# Patient Record
Sex: FEMALE | Race: WHITE | NOT HISPANIC OR LATINO | Employment: OTHER | URBAN - NONMETROPOLITAN AREA
[De-identification: names, ages, dates, MRNs, and addresses within clinical notes are randomized per-mention and may not be internally consistent; named-entity substitution may affect disease eponyms.]

---

## 2021-02-08 ENCOUNTER — OFFICE VISIT (OUTPATIENT)
Dept: INTERNAL MEDICINE | Facility: CLINIC | Age: 76
End: 2021-02-08

## 2021-02-08 VITALS
DIASTOLIC BLOOD PRESSURE: 102 MMHG | WEIGHT: 144.4 LBS | HEIGHT: 64 IN | HEART RATE: 112 BPM | OXYGEN SATURATION: 97 % | SYSTOLIC BLOOD PRESSURE: 160 MMHG | BODY MASS INDEX: 24.65 KG/M2 | TEMPERATURE: 98.4 F

## 2021-02-08 DIAGNOSIS — Z13.220 LIPID SCREENING: ICD-10-CM

## 2021-02-08 DIAGNOSIS — R01.1 MURMUR: ICD-10-CM

## 2021-02-08 DIAGNOSIS — I10 ESSENTIAL HYPERTENSION: Primary | ICD-10-CM

## 2021-02-08 DIAGNOSIS — R42 DIZZINESS: ICD-10-CM

## 2021-02-08 DIAGNOSIS — H92.01 RIGHT EAR PAIN: ICD-10-CM

## 2021-02-08 PROCEDURE — 99214 OFFICE O/P EST MOD 30 MIN: CPT | Performed by: FAMILY MEDICINE

## 2021-02-08 RX ORDER — LISINOPRIL 20 MG/1
20 TABLET ORAL DAILY
Qty: 90 TABLET | Refills: 1 | Status: SHIPPED | OUTPATIENT
Start: 2021-02-08 | End: 2021-03-08

## 2021-02-08 RX ORDER — MECLIZINE HYDROCHLORIDE 25 MG/1
25 TABLET ORAL 3 TIMES DAILY PRN
Qty: 90 TABLET | Refills: 0 | Status: SHIPPED | OUTPATIENT
Start: 2021-02-08

## 2021-02-08 NOTE — ASSESSMENT & PLAN NOTE
Patient has never been diagnosed with a murmur.  As this is a new finding, will obtain echocardiogram for further evaluation.

## 2021-02-08 NOTE — ASSESSMENT & PLAN NOTE
Likely secondary to recent ear infection and residual fluid on the ear.  May take meclizine prn for dizziness.

## 2021-02-08 NOTE — PROGRESS NOTES
Nellie Martinez is a 75 y.o. female.    Chief Complaint   Patient presents with   • Earache   • Hypertension       HPI   Patient presents today to \[facility]\"" care.  She was seen at [facility] for an ear infection and was treated with amoxicillin and ear drops.  She is still having trouble hearing out of right ear.  She still has pain in the ear.  Denies any discharge from the ear.  She reports nasal discharge in the morning, but no other sinus symptoms.      Patient has hypertension that was diagnosed in the urgent care. .  They are taking lisinopril (Prinivil).  They have been compliant with medications.  The patient denies any side effects to the medication.  Blood pressure is not controlled in the office today.  Blood pressure has been running 140/70's at home.  They are following a low salt diet.  They are active.    The following portions of the patient's history were reviewed and updated as appropriate: allergies, current medications, past family history, past medical history, past social history, past surgical history and problem list.     Past Medical History:   Diagnosis Date   • Hypertension        Past Surgical History:   Procedure Laterality Date   • APPENDECTOMY     • HERNIA REPAIR         Family History   Problem Relation Age of Onset   • Stroke Mother    • Hypertension Mother    • Heart attack Father    • Stomach cancer Paternal Grandmother        Social History     Socioeconomic History   • Marital status:      Spouse name: Not on file   • Number of children: Not on file   • Years of education: Not on file   • Highest education level: Not on file   Tobacco Use   • Smoking status: Former Smoker   • Smokeless tobacco: Never Used   • Tobacco comment: quit 5 years ago   Substance and Sexual Activity   • Alcohol use: No     Frequency: Never   • Drug use: No   • Sexual activity: Yes     Partners: Male       No Known Allergies      Current Outpatient Medications:   •  ascorbic acid (VITAMIN C) 500 MG  tablet, Take 500 mg by mouth Daily., Disp: , Rfl:   •  Calcium 500-100 MG-UNIT chewable tablet, Chew 1 tablet Every 4 (Four) Hours As Needed., Disp: , Rfl:   •  lisinopril (PRINIVIL,ZESTRIL) 20 MG tablet, Take 1 tablet by mouth Daily for 14 days., Disp: 90 tablet, Rfl: 1  •  multivitamin with minerals (CENTRUM SILVER 50+WOMEN PO), Take 1 tablet by mouth Daily., Disp: , Rfl:   •  Potassium 75 MG tablet, Take  by mouth., Disp: , Rfl:   •  vitamin D3 125 MCG (5000 UT) capsule capsule, Take 5,000 Units by mouth Daily., Disp: , Rfl:   •  vitamin E 400 UNIT capsule, Take 400 Units by mouth Daily., Disp: , Rfl:   •  meclizine (ANTIVERT) 25 MG tablet, Take 1 tablet by mouth 3 (Three) Times a Day As Needed for Dizziness., Disp: 90 tablet, Rfl: 0    ROS    Review of Systems   Constitutional: Negative for chills, fatigue and fever.   HENT: Positive for ear pain and hearing loss. Negative for congestion, postnasal drip and sore throat.    Eyes: Negative for blurred vision and visual disturbance.   Respiratory: Negative for cough and shortness of breath.    Cardiovascular: Negative for chest pain and leg swelling.   Gastrointestinal: Negative for abdominal pain, constipation, diarrhea, nausea and vomiting.   Endocrine: Negative for cold intolerance and heat intolerance.   Genitourinary: Negative for dysuria and frequency.   Musculoskeletal: Negative for arthralgias and back pain.   Skin: Negative for color change and rash.   Allergic/Immunologic: Negative for environmental allergies.   Neurological: Positive for dizziness. Negative for weakness, numbness and headache.   Hematological: Bruises/bleeds easily.   Psychiatric/Behavioral: Negative for depressed mood. The patient is not nervous/anxious.        Vitals:    02/08/21 1516   BP: (!) 160/102   BP Location: Left arm   Patient Position: Sitting   Cuff Size: Adult   Pulse: 112   Temp: 98.4 °F (36.9 °C)   TempSrc: Temporal   SpO2: 97%   Weight: 65.5 kg (144 lb 6.4 oz)   Height:  "162.6 cm (64\")     Body mass index is 24.79 kg/m².    Physical Exam     Physical Exam  Constitutional:       General: She is not in acute distress.     Appearance: She is well-developed.   HENT:      Head: Normocephalic and atraumatic.      Right Ear: External ear normal. Tenderness present. A middle ear effusion (trace) is present.      Left Ear: Tympanic membrane and external ear normal.   Eyes:      Conjunctiva/sclera: Conjunctivae normal.      Pupils: Pupils are equal, round, and reactive to light.   Neck:      Musculoskeletal: Normal range of motion and neck supple.   Cardiovascular:      Rate and Rhythm: Normal rate and regular rhythm.      Heart sounds: Murmur present.   Pulmonary:      Effort: Pulmonary effort is normal. No respiratory distress.      Breath sounds: Normal breath sounds. No wheezing.   Abdominal:      General: Bowel sounds are normal. There is no distension.      Palpations: Abdomen is soft.      Tenderness: There is no abdominal tenderness.   Musculoskeletal:      Right lower leg: No edema.      Left lower leg: No edema.   Lymphadenopathy:      Cervical: No cervical adenopathy.   Skin:     General: Skin is warm and dry.   Neurological:      Mental Status: She is alert and oriented to person, place, and time.      Cranial Nerves: No cranial nerve deficit.      Deep Tendon Reflexes: Reflexes normal.   Psychiatric:         Mood and Affect: Mood normal.         Behavior: Behavior normal.         Assessment/Plan    Problems Addressed this Visit        Cardiac and Vasculature    Essential hypertension - Primary     Uncontrolled.  Will increase lisinopril to 20mg daily.           Relevant Medications    lisinopril (PRINIVIL,ZESTRIL) 20 MG tablet    Other Relevant Orders    CBC & Differential    Comprehensive Metabolic Panel    TSH    Murmur     Patient has never been diagnosed with a murmur.  As this is a new finding, will obtain echocardiogram for further evaluation.          Relevant Orders    " Adult Transthoracic Echo Complete W/ Cont if Necessary Per Protocol       ENT    Right ear pain     Likely residual pain from recent ear infection.  She does still have trace middle ear effusion, which may be contributing to dizziness.  Encouraged to take allegra OTC for effusion.             Symptoms and Signs    Dizziness     Likely secondary to recent ear infection and residual fluid on the ear.  May take meclizine prn for dizziness.            Other Visit Diagnoses     Lipid screening        Relevant Orders    Lipid Panel       New Medications Ordered This Visit   Medications   • lisinopril (PRINIVIL,ZESTRIL) 20 MG tablet     Sig: Take 1 tablet by mouth Daily for 14 days.     Dispense:  90 tablet     Refill:  1   • meclizine (ANTIVERT) 25 MG tablet     Sig: Take 1 tablet by mouth 3 (Three) Times a Day As Needed for Dizziness.     Dispense:  90 tablet     Refill:  0       No orders of the defined types were placed in this encounter.      Return in about 1 month (around 3/8/2021) for HTN.      Faviola Hanson,

## 2021-02-08 NOTE — ASSESSMENT & PLAN NOTE
Likely residual pain from recent ear infection.  She does still have trace middle ear effusion, which may be contributing to dizziness.  Encouraged to take allegra OTC for effusion.

## 2021-02-09 LAB
ALBUMIN SERPL-MCNC: 4.5 G/DL (ref 3.5–5.2)
ALBUMIN/GLOB SERPL: 1.6 G/DL
ALP SERPL-CCNC: 81 U/L (ref 39–117)
ALT SERPL-CCNC: 18 U/L (ref 1–33)
AST SERPL-CCNC: 25 U/L (ref 1–32)
BASOPHILS # BLD AUTO: 0.06 10*3/MM3 (ref 0–0.2)
BASOPHILS NFR BLD AUTO: 0.8 % (ref 0–1.5)
BILIRUB SERPL-MCNC: 0.2 MG/DL (ref 0–1.2)
BUN SERPL-MCNC: 11 MG/DL (ref 8–23)
BUN/CREAT SERPL: 16.4 (ref 7–25)
CALCIUM SERPL-MCNC: 9.8 MG/DL (ref 8.6–10.5)
CHLORIDE SERPL-SCNC: 95 MMOL/L (ref 98–107)
CHOLEST SERPL-MCNC: 211 MG/DL (ref 0–200)
CO2 SERPL-SCNC: 27.1 MMOL/L (ref 22–29)
CREAT SERPL-MCNC: 0.67 MG/DL (ref 0.57–1)
EOSINOPHIL # BLD AUTO: 0.06 10*3/MM3 (ref 0–0.4)
EOSINOPHIL NFR BLD AUTO: 0.8 % (ref 0.3–6.2)
ERYTHROCYTE [DISTWIDTH] IN BLOOD BY AUTOMATED COUNT: 12.7 % (ref 12.3–15.4)
GLOBULIN SER CALC-MCNC: 2.8 GM/DL
GLUCOSE SERPL-MCNC: 111 MG/DL (ref 65–99)
HCT VFR BLD AUTO: 42.5 % (ref 34–46.6)
HDLC SERPL-MCNC: 69 MG/DL (ref 40–60)
HGB BLD-MCNC: 14.1 G/DL (ref 12–15.9)
IMM GRANULOCYTES # BLD AUTO: 0.03 10*3/MM3 (ref 0–0.05)
IMM GRANULOCYTES NFR BLD AUTO: 0.4 % (ref 0–0.5)
LDLC SERPL CALC-MCNC: 128 MG/DL (ref 0–100)
LYMPHOCYTES # BLD AUTO: 1.78 10*3/MM3 (ref 0.7–3.1)
LYMPHOCYTES NFR BLD AUTO: 23 % (ref 19.6–45.3)
MCH RBC QN AUTO: 29.9 PG (ref 26.6–33)
MCHC RBC AUTO-ENTMCNC: 33.2 G/DL (ref 31.5–35.7)
MCV RBC AUTO: 90.2 FL (ref 79–97)
MONOCYTES # BLD AUTO: 0.42 10*3/MM3 (ref 0.1–0.9)
MONOCYTES NFR BLD AUTO: 5.4 % (ref 5–12)
NEUTROPHILS # BLD AUTO: 5.39 10*3/MM3 (ref 1.7–7)
NEUTROPHILS NFR BLD AUTO: 69.6 % (ref 42.7–76)
NRBC BLD AUTO-RTO: 0 /100 WBC (ref 0–0.2)
PLATELET # BLD AUTO: 309 10*3/MM3 (ref 140–450)
POTASSIUM SERPL-SCNC: 5.1 MMOL/L (ref 3.5–5.2)
PROT SERPL-MCNC: 7.3 G/DL (ref 6–8.5)
RBC # BLD AUTO: 4.71 10*6/MM3 (ref 3.77–5.28)
SODIUM SERPL-SCNC: 133 MMOL/L (ref 136–145)
TRIGL SERPL-MCNC: 78 MG/DL (ref 0–150)
TSH SERPL DL<=0.005 MIU/L-ACNC: 1.26 UIU/ML (ref 0.27–4.2)
VLDLC SERPL CALC-MCNC: 14 MG/DL (ref 5–40)
WBC # BLD AUTO: 7.74 10*3/MM3 (ref 3.4–10.8)

## 2021-03-03 LAB
BH CV ECHO MEAS - % IVS THICK: 22.2 %
BH CV ECHO MEAS - % LVPW THICK: 23.1 %
BH CV ECHO MEAS - AO MAX PG (FULL): 1.1 MMHG
BH CV ECHO MEAS - AO MAX PG: 8 MMHG
BH CV ECHO MEAS - AO MEAN PG (FULL): 1 MMHG
BH CV ECHO MEAS - AO MEAN PG: 4 MMHG
BH CV ECHO MEAS - AO ROOT AREA (BSA CORRECTED): 1.2
BH CV ECHO MEAS - AO ROOT AREA: 3.5 CM^2
BH CV ECHO MEAS - AO ROOT DIAM: 2.1 CM
BH CV ECHO MEAS - AO V2 MAX: 140 CM/SEC
BH CV ECHO MEAS - AO V2 MEAN: 94.7 CM/SEC
BH CV ECHO MEAS - AO V2 VTI: 25.6 CM
BH CV ECHO MEAS - AVA(I,A): 3.3 CM^2
BH CV ECHO MEAS - AVA(I,D): 3.3 CM^2
BH CV ECHO MEAS - AVA(V,A): 3.3 CM^2
BH CV ECHO MEAS - AVA(V,D): 3.3 CM^2
BH CV ECHO MEAS - BSA(HAYCOCK): 1.7 M^2
BH CV ECHO MEAS - BSA: 1.7 M^2
BH CV ECHO MEAS - BZI_BMI: 24.7 KILOGRAMS/M^2
BH CV ECHO MEAS - BZI_METRIC_HEIGHT: 162.6 CM
BH CV ECHO MEAS - BZI_METRIC_WEIGHT: 65.3 KG
BH CV ECHO MEAS - EDV(CUBED): 35.9 ML
BH CV ECHO MEAS - EDV(MOD-SP4): 92 ML
BH CV ECHO MEAS - EDV(TEICH): 44.1 ML
BH CV ECHO MEAS - EF(CUBED): 74.2 %
BH CV ECHO MEAS - EF(MOD-SP4): 72.8 %
BH CV ECHO MEAS - EF(TEICH): 67.4 %
BH CV ECHO MEAS - ESV(CUBED): 9.3 ML
BH CV ECHO MEAS - ESV(MOD-SP4): 25 ML
BH CV ECHO MEAS - ESV(TEICH): 14.4 ML
BH CV ECHO MEAS - FS: 36.4 %
BH CV ECHO MEAS - IVS/LVPW: 0.69
BH CV ECHO MEAS - IVSD: 0.9 CM
BH CV ECHO MEAS - IVSS: 1.1 CM
BH CV ECHO MEAS - LA DIMENSION: 3.6 CM
BH CV ECHO MEAS - LA/AO: 1.7
BH CV ECHO MEAS - LAD MAJOR: 5 CM
BH CV ECHO MEAS - LAT PEAK E' VEL: 14.1 CM/SEC
BH CV ECHO MEAS - LATERAL E/E' RATIO: 4.6
BH CV ECHO MEAS - LV DIASTOLIC VOL/BSA (35-75): 54.1 ML/M^2
BH CV ECHO MEAS - LV IVRT: 0.09 SEC
BH CV ECHO MEAS - LV MASS(C)D: 109.1 GRAMS
BH CV ECHO MEAS - LV MASS(C)DI: 64.1 GRAMS/M^2
BH CV ECHO MEAS - LV MASS(C)S: 84.9 GRAMS
BH CV ECHO MEAS - LV MASS(C)SI: 49.9 GRAMS/M^2
BH CV ECHO MEAS - LV MAX PG: 6.9 MMHG
BH CV ECHO MEAS - LV MEAN PG: 3 MMHG
BH CV ECHO MEAS - LV SYSTOLIC VOL/BSA (12-30): 14.7 ML/M^2
BH CV ECHO MEAS - LV V1 MAX: 131.5 CM/SEC
BH CV ECHO MEAS - LV V1 MEAN: 73.9 CM/SEC
BH CV ECHO MEAS - LV V1 VTI: 24.4 CM
BH CV ECHO MEAS - LVIDD: 3.3 CM
BH CV ECHO MEAS - LVIDS: 2.1 CM
BH CV ECHO MEAS - LVLD AP4: 7.4 CM
BH CV ECHO MEAS - LVLS AP4: 5.4 CM
BH CV ECHO MEAS - LVOT AREA (M): 3.5 CM^2
BH CV ECHO MEAS - LVOT AREA: 3.5 CM^2
BH CV ECHO MEAS - LVOT DIAM: 2.1 CM
BH CV ECHO MEAS - LVPWD: 1 CM
BH CV ECHO MEAS - LVPWS: 1.6 CM
BH CV ECHO MEAS - MED PEAK E' VEL: 8.5 CM/SEC
BH CV ECHO MEAS - MEDIAL E/E' RATIO: 7.6
BH CV ECHO MEAS - MV A MAX VEL: 94.2 CM/SEC
BH CV ECHO MEAS - MV DEC SLOPE: 392.5 CM/SEC^2
BH CV ECHO MEAS - MV DEC TIME: 0.19 SEC
BH CV ECHO MEAS - MV E MAX VEL: 64.6 CM/SEC
BH CV ECHO MEAS - MV E/A: 0.69
BH CV ECHO MEAS - MV MAX PG: 5.1 MMHG
BH CV ECHO MEAS - MV MEAN PG: 2 MMHG
BH CV ECHO MEAS - MV P1/2T MAX VEL: 65.3 CM/SEC
BH CV ECHO MEAS - MV P1/2T: 48.7 MSEC
BH CV ECHO MEAS - MV V2 MAX: 113 CM/SEC
BH CV ECHO MEAS - MV V2 MEAN: 73.9 CM/SEC
BH CV ECHO MEAS - MV V2 VTI: 21.4 CM
BH CV ECHO MEAS - MVA P1/2T LCG: 3.4 CM^2
BH CV ECHO MEAS - MVA(P1/2T): 4.5 CM^2
BH CV ECHO MEAS - MVA(VTI): 3.9 CM^2
BH CV ECHO MEAS - PA MAX PG: 4.7 MMHG
BH CV ECHO MEAS - PA V2 MAX: 108 CM/SEC
BH CV ECHO MEAS - RAP SYSTOLE: 3 MMHG
BH CV ECHO MEAS - RVSP: 36 MMHG
BH CV ECHO MEAS - SI(AO): 52.1 ML/M^2
BH CV ECHO MEAS - SI(CUBED): 15.7 ML/M^2
BH CV ECHO MEAS - SI(LVOT): 49.7 ML/M^2
BH CV ECHO MEAS - SI(MOD-SP4): 39.4 ML/M^2
BH CV ECHO MEAS - SI(TEICH): 17.5 ML/M^2
BH CV ECHO MEAS - SV(AO): 88.7 ML
BH CV ECHO MEAS - SV(CUBED): 26.7 ML
BH CV ECHO MEAS - SV(LVOT): 84.5 ML
BH CV ECHO MEAS - SV(MOD-SP4): 67 ML
BH CV ECHO MEAS - SV(TEICH): 29.7 ML
BH CV ECHO MEAS - TR MAX PG: 33 MMHG
BH CV ECHO MEAS - TR MAX VEL: 289 CM/SEC
BH CV ECHO MEAS - TV MAX PG: 1.7 MMHG
BH CV ECHO MEAS - TV V2 MAX: 66 CM/SEC
BH CV ECHO MEASUREMENTS AVERAGE E/E' RATIO: 5.72
BH CV STRESS DURATION MIN STAGE 1: 3
BH CV STRESS DURATION SEC STAGE 1: 0
BH CV STRESS GRADE STAGE 1: 10
BH CV STRESS METS STAGE 1: 5
BH CV STRESS PROTOCOL 1: NORMAL
BH CV STRESS SPEED STAGE 1: 1.7
BH CV STRESS STAGE 1: 1
LEFT ATRIUM VOLUME INDEX: 28.8 ML/M^2
LEFT ATRIUM VOLUME: 49 ML
LV EF 2D ECHO EST: 70 %

## 2021-03-08 ENCOUNTER — OFFICE VISIT (OUTPATIENT)
Dept: INTERNAL MEDICINE | Facility: CLINIC | Age: 76
End: 2021-03-08

## 2021-03-08 VITALS
WEIGHT: 149.5 LBS | HEART RATE: 111 BPM | BODY MASS INDEX: 25.52 KG/M2 | DIASTOLIC BLOOD PRESSURE: 80 MMHG | SYSTOLIC BLOOD PRESSURE: 158 MMHG | HEIGHT: 64 IN | OXYGEN SATURATION: 99 % | TEMPERATURE: 97.7 F

## 2021-03-08 DIAGNOSIS — I10 ESSENTIAL HYPERTENSION: Primary | ICD-10-CM

## 2021-03-08 DIAGNOSIS — R00.0 TACHYCARDIA: ICD-10-CM

## 2021-03-08 DIAGNOSIS — I27.20 PULMONARY HYPERTENSION (HCC): ICD-10-CM

## 2021-03-08 DIAGNOSIS — R01.1 MURMUR: ICD-10-CM

## 2021-03-08 DIAGNOSIS — R42 DIZZINESS: ICD-10-CM

## 2021-03-08 PROCEDURE — 99214 OFFICE O/P EST MOD 30 MIN: CPT | Performed by: FAMILY MEDICINE

## 2021-03-08 RX ORDER — LOSARTAN POTASSIUM AND HYDROCHLOROTHIAZIDE 25; 100 MG/1; MG/1
1 TABLET ORAL DAILY
Qty: 90 TABLET | Refills: 1 | Status: SHIPPED | OUTPATIENT
Start: 2021-03-08 | End: 2021-08-09 | Stop reason: SDUPTHER

## 2021-03-08 NOTE — PROGRESS NOTES
Nellie Martinez is a 75 y.o. female.    Chief Complaint   Patient presents with   • Hypertension       HPI   Patient presents today to follow-up on hypertension.  Blood pressure is improved, but remains elevated.  She has been compliant with taking lisinopril daily.  She does report increased cough since starting the medication.  She also reports good blood pressure readings at home with a systolic reading of 120.  She does admit to improvement with dizziness and lightheadedness since taking the medication.  In addition, she has been taking Allegra for eustachian tube dysfunction.  She denies any ear pain today.  She does have a difficult time hearing.    Patient did have a recent echocardiogram done to evaluate a new murmur.  She does continue to have a murmur on exam today.  Echocardiogram did show mild tricuspid regurgitation with pulmonary hypertension.  No other significant findings.  She does not currently see a radiologist.    The following portions of the patient's history were reviewed and updated as appropriate: allergies, current medications, past family history, past medical history, past social history, past surgical history and problem list.     No Known Allergies      Current Outpatient Medications:   •  ascorbic acid (VITAMIN C) 500 MG tablet, Take 500 mg by mouth Daily., Disp: , Rfl:   •  Calcium 500-100 MG-UNIT chewable tablet, Chew 1 tablet Every 4 (Four) Hours As Needed., Disp: , Rfl:   •  meclizine (ANTIVERT) 25 MG tablet, Take 1 tablet by mouth 3 (Three) Times a Day As Needed for Dizziness., Disp: 90 tablet, Rfl: 0  •  multivitamin with minerals (CENTRUM SILVER 50+WOMEN PO), Take 1 tablet by mouth Daily., Disp: , Rfl:   •  vitamin D3 125 MCG (5000 UT) capsule capsule, Take 5,000 Units by mouth Daily., Disp: , Rfl:   •  losartan-hydrochlorothiazide (Hyzaar) 100-25 MG per tablet, Take 1 tablet by mouth Daily., Disp: 90 tablet, Rfl: 1    ROS    Review of Systems   Constitutional: Negative for  "chills and fever.   HENT: Positive for hearing loss. Negative for ear pain.    Respiratory: Positive for cough and shortness of breath (rarely on exertion).    Cardiovascular: Negative for chest pain.   Gastrointestinal: Negative for diarrhea, nausea and vomiting.   Neurological: Negative for dizziness and headache.       Vitals:    03/08/21 1326   BP: 158/80   BP Location: Left arm   Patient Position: Sitting   Cuff Size: Adult   Pulse: 111   Temp: 97.7 °F (36.5 °C)   TempSrc: Temporal   SpO2: 99%   Weight: 67.8 kg (149 lb 8 oz)   Height: 162.6 cm (64\")     Body mass index is 25.66 kg/m².    Physical Exam     Physical Exam  Constitutional:       General: She is not in acute distress.     Appearance: Normal appearance. She is well-developed.   HENT:      Head: Normocephalic and atraumatic.      Right Ear: External ear normal. Decreased hearing noted.      Left Ear: External ear normal. Decreased hearing noted.   Eyes:      Extraocular Movements: Extraocular movements intact.      Conjunctiva/sclera: Conjunctivae normal.   Cardiovascular:      Rate and Rhythm: Normal rate and regular rhythm.      Heart sounds: No murmur.   Pulmonary:      Effort: Pulmonary effort is normal. No respiratory distress.      Breath sounds: Normal breath sounds. No wheezing.   Abdominal:      General: Bowel sounds are normal. There is no distension.      Palpations: Abdomen is soft.      Tenderness: There is no abdominal tenderness.   Skin:     General: Skin is warm and dry.   Neurological:      Mental Status: She is alert and oriented to person, place, and time.      Cranial Nerves: No cranial nerve deficit.   Psychiatric:         Mood and Affect: Mood normal.         Behavior: Behavior normal.         Assessment/Plan    Problems Addressed this Visit        Cardiac and Vasculature    Essential hypertension - Primary     Controlled on current medication.  Patient also developed cough with lisinopril.  Will change to " losartan-hydrochlorothiazide.         Relevant Medications    losartan-hydrochlorothiazide (Hyzaar) 100-25 MG per tablet    Murmur    Relevant Orders    Ambulatory Referral to Cardiology       Pulmonary and Pneumonias    Pulmonary hypertension (CMS/HCC)    Relevant Orders    Ambulatory Referral to Cardiology       Symptoms and Signs    Dizziness     Resolved with better blood pressure control and Allegra.  Patient may hold Allegra at the present time.           Other Visit Diagnoses     Tachycardia        Relevant Orders    Ambulatory Referral to Cardiology          New Medications Ordered This Visit   Medications   • losartan-hydrochlorothiazide (Hyzaar) 100-25 MG per tablet     Sig: Take 1 tablet by mouth Daily.     Dispense:  90 tablet     Refill:  1       No orders of the defined types were placed in this encounter.      Return in about 1 month (around 4/8/2021) for HTN.    Faviola Hanson, DO

## 2021-03-08 NOTE — ASSESSMENT & PLAN NOTE
Controlled on current medication.  Patient also developed cough with lisinopril.  Will change to losartan-hydrochlorothiazide.

## 2021-04-08 ENCOUNTER — OFFICE VISIT (OUTPATIENT)
Dept: INTERNAL MEDICINE | Facility: CLINIC | Age: 76
End: 2021-04-08

## 2021-04-08 VITALS
HEIGHT: 64 IN | BODY MASS INDEX: 24.75 KG/M2 | TEMPERATURE: 97.8 F | HEART RATE: 94 BPM | WEIGHT: 145 LBS | RESPIRATION RATE: 16 BRPM | DIASTOLIC BLOOD PRESSURE: 76 MMHG | SYSTOLIC BLOOD PRESSURE: 149 MMHG | OXYGEN SATURATION: 99 %

## 2021-04-08 DIAGNOSIS — I27.20 PULMONARY HYPERTENSION (HCC): Primary | ICD-10-CM

## 2021-04-08 DIAGNOSIS — I10 ESSENTIAL HYPERTENSION: ICD-10-CM

## 2021-04-08 DIAGNOSIS — Z78.0 POSTMENOPAUSAL: ICD-10-CM

## 2021-04-08 PROCEDURE — 99213 OFFICE O/P EST LOW 20 MIN: CPT | Performed by: FAMILY MEDICINE

## 2021-04-08 NOTE — PROGRESS NOTES
Nellie Martinez is a 75 y.o. female.    Chief Complaint   Patient presents with   • Hypertension       HPI   Patient presents to follow-up on hypertension.  She reports elevated readings at home on occasion.  Blood pressure cuff was brought to the office and is reading approximately 20 points higher than manual reading.  Patient reports no changes in vision, light headedness.  No palpitations, chest pain.  Blood pressure was significantly uncontrolled on manual reading on first check.  However, blood pressure did come down on recheck, though still not controlled.  Patient is also been diagnosed with mild pulmonary hypertension on echocardiogram.  She does have an upcoming appointment with cardiology to establish care.    The following portions of the patient's history were reviewed and updated as appropriate: allergies, current medications, past family history, past medical history, past social history, past surgical history and problem list.     No Known Allergies      Current Outpatient Medications:   •  ascorbic acid (VITAMIN C) 500 MG tablet, Take 500 mg by mouth Daily., Disp: , Rfl:   •  Calcium 500-100 MG-UNIT chewable tablet, Chew 1 tablet Every 4 (Four) Hours As Needed., Disp: , Rfl:   •  losartan-hydrochlorothiazide (Hyzaar) 100-25 MG per tablet, Take 1 tablet by mouth Daily., Disp: 90 tablet, Rfl: 1  •  meclizine (ANTIVERT) 25 MG tablet, Take 1 tablet by mouth 3 (Three) Times a Day As Needed for Dizziness., Disp: 90 tablet, Rfl: 0  •  multivitamin with minerals (CENTRUM SILVER 50+WOMEN PO), Take 1 tablet by mouth Daily., Disp: , Rfl:   •  potassium bicarbonate (K-LYTE) 25 MEQ disintegrating tablet, Take 25 mEq by mouth 2 (Two) Times a Day., Disp: , Rfl:   •  vitamin D3 125 MCG (5000 UT) capsule capsule, Take 5,000 Units by mouth Daily., Disp: , Rfl:     ROS    Review of Systems   Constitutional: Negative for chills, fatigue and fever.   Eyes: Negative for visual disturbance.   Respiratory: Negative for  "cough and shortness of breath.    Cardiovascular: Negative for chest pain and palpitations.   Neurological: Negative for dizziness and light-headedness.       Vitals:    04/08/21 1318 04/08/21 1324 04/08/21 1339   BP: 160/94  Comment: regular cuff (!) 181/93  Comment: Patient's automatic cuff 149/76   BP Location:   Right arm   Pulse: 94     Resp: 16     Temp: 97.8 °F (36.6 °C)     SpO2: 99%     Weight: 65.8 kg (145 lb)     Height: 162.6 cm (64\")       Body mass index is 24.89 kg/m².    Physical Exam     Physical Exam  Constitutional:       General: She is not in acute distress.     Appearance: She is well-developed.   HENT:      Head: Normocephalic and atraumatic.      Right Ear: External ear normal.      Left Ear: External ear normal.   Eyes:      Conjunctiva/sclera: Conjunctivae normal.   Cardiovascular:      Rate and Rhythm: Normal rate and regular rhythm.      Heart sounds: No murmur heard.     Pulmonary:      Effort: Pulmonary effort is normal. No respiratory distress.      Breath sounds: Normal breath sounds. No wheezing.   Abdominal:      General: Bowel sounds are normal. There is no distension.      Palpations: Abdomen is soft.      Tenderness: There is no abdominal tenderness.   Neurological:      Mental Status: She is alert and oriented to person, place, and time.      Cranial Nerves: No cranial nerve deficit.   Psychiatric:         Mood and Affect: Mood normal.         Assessment/Plan    Problems Addressed this Visit        Cardiac and Vasculature    Essential hypertension     Uncontrolled.  However, blood pressure did drop to much more reasonable level on recheck.  No adjustments to medication are being made today.  She will continue losartan-hydrochlorothiazide.  Patient will follow up with cardiology as scheduled.            Pulmonary and Pneumonias    Pulmonary hypertension (CMS/HCC) - Primary     Observed on recent echocardiogram.  Patient has an appointment to establish care with cardiology.      "      Other Visit Diagnoses     Postmenopausal        Relevant Orders    DEXA Bone Density Axial        No orders of the defined types were placed in this encounter.      No orders of the defined types were placed in this encounter.      Return in about 4 months (around 8/8/2021) for Medicare Wellness.    Faviola Hanson,

## 2021-04-19 NOTE — ASSESSMENT & PLAN NOTE
Uncontrolled.  However, blood pressure did drop to much more reasonable level on recheck.  No adjustments to medication are being made today.  She will continue losartan-hydrochlorothiazide.  Patient will follow up with cardiology as scheduled.

## 2021-04-21 ENCOUNTER — APPOINTMENT (OUTPATIENT)
Dept: BONE DENSITY | Facility: HOSPITAL | Age: 76
End: 2021-04-21

## 2021-04-21 PROCEDURE — 77080 DXA BONE DENSITY AXIAL: CPT

## 2021-04-23 RX ORDER — ALENDRONATE SODIUM 35 MG/1
35 TABLET ORAL
Qty: 15 TABLET | Refills: 3 | Status: SHIPPED | OUTPATIENT
Start: 2021-04-23 | End: 2022-02-10 | Stop reason: SDUPTHER

## 2021-05-27 ENCOUNTER — OFFICE VISIT (OUTPATIENT)
Dept: CARDIOLOGY | Facility: CLINIC | Age: 76
End: 2021-05-27

## 2021-05-27 VITALS
HEIGHT: 64 IN | RESPIRATION RATE: 18 BRPM | WEIGHT: 142.8 LBS | BODY MASS INDEX: 24.38 KG/M2 | SYSTOLIC BLOOD PRESSURE: 158 MMHG | OXYGEN SATURATION: 99 % | DIASTOLIC BLOOD PRESSURE: 80 MMHG | HEART RATE: 83 BPM

## 2021-05-27 DIAGNOSIS — I10 ESSENTIAL HYPERTENSION: ICD-10-CM

## 2021-05-27 DIAGNOSIS — I27.20 PULMONARY HYPERTENSION (HCC): Primary | ICD-10-CM

## 2021-05-27 PROCEDURE — 93000 ELECTROCARDIOGRAM COMPLETE: CPT | Performed by: INTERNAL MEDICINE

## 2021-05-27 PROCEDURE — 99204 OFFICE O/P NEW MOD 45 MIN: CPT | Performed by: INTERNAL MEDICINE

## 2021-05-27 RX ORDER — FEXOFENADINE HCL 180 MG/1
180 TABLET ORAL DAILY
COMMUNITY

## 2021-05-27 RX ORDER — VITAMIN E 268 MG
400 CAPSULE ORAL DAILY
COMMUNITY

## 2021-05-27 NOTE — PROGRESS NOTES
[facility] Cardiology OP Consult Note    Nellie Martinez  [id number]  2021    Referred By: Faviola Hanson, *    Chief Complaint: Abnormal echocardiogram    History of Present Illness:   Mrs. Nellie Martinez is a 75 y.o. female who presents to the Cardiology Clinic for evaluation of an abnormal echocardiogram.  The patient has a past medical history significant for hypertension and cirrhosis.  She does not have any significant past cardiac history.  She recently had an outpatient echocardiogram in 3/21 after a murmur was noted on follow-up with her primary care physician.  Her echocardiogram subsequently revealed normal LV systolic and diastolic function with mild TR and pulmonary hypertension.  She was subsequent referred to cardiology for further evaluation.  Currently, the patient reports she is doing well.  She is active walking over 10,000 steps per day.  She denies any exertional dyspnea, including when walking up steps in her duplex.  She denies exertional chest pain or chest discomfort.  No history of orthopnea, PND, or lower extremity swelling.  She denies any significant palpitations.  No specific complaints at this time.    Past Cardiac Testin. Last Coronary Angio: None  2. Prior Stress Testing: None  3. Last Echo: 3/3/2021   1.  Normal left ventricular static function, LVEF 65-70%   2.  Diastolic function consistent with age   3.  Mild right atrial dilation   4.  Mild pulmonary hypertension  4. Prior Holter Monitor: None    Review of Systems:   Review of Systems   Constitutional: Negative for activity change, appetite change, chills, diaphoresis, fatigue, fever, unexpected weight gain and unexpected weight loss.   Eyes: Negative for blurred vision and double vision.   Respiratory: Negative for cough, chest tightness, shortness of breath and wheezing.    Cardiovascular: Negative for chest pain, palpitations and leg swelling.   Gastrointestinal: Negative for abdominal  pain, anal bleeding, blood in stool and GERD.   Endocrine: Negative for cold intolerance and heat intolerance.   Genitourinary: Negative for hematuria.   Neurological: Negative for dizziness, syncope, weakness and light-headedness.   Hematological: Does not bruise/bleed easily.   Psychiatric/Behavioral: Negative for depressed mood and stress. The patient is not nervous/anxious.        Past Medical History:   Past Medical History:   Diagnosis Date   • Hypertension        Past Surgical History:   Past Surgical History:   Procedure Laterality Date   • APPENDECTOMY     • HERNIA REPAIR         Family History:   Family History   Problem Relation Age of Onset   • Stroke Mother    • Hypertension Mother    • Heart attack Father    • Stomach cancer Paternal Grandmother        Social History:   Social History     Socioeconomic History   • Marital status:      Spouse name: Not on file   • Number of children: Not on file   • Years of education: Not on file   • Highest education level: Not on file   Tobacco Use   • Smoking status: Former Smoker   • Smokeless tobacco: Never Used   • Tobacco comment: quit 5 years ago   Vaping Use   • Vaping Use: Never used   Substance and Sexual Activity   • Alcohol use: No   • Drug use: No   • Sexual activity: Yes     Partners: Male       Medications:     Current Outpatient Medications:   •  alendronate (FOSAMAX) 35 MG tablet, Take 1 tablet by mouth Every 7 (Seven) Days., Disp: 15 tablet, Rfl: 3  •  ascorbic acid (VITAMIN C) 1000 MG tablet, Take 1,000 mg by mouth Daily., Disp: , Rfl:   •  Calcium 500-100 MG-UNIT chewable tablet, Chew 1 tablet Every 4 (Four) Hours As Needed., Disp: , Rfl:   •  Cholecalciferol (Vitamin D3) 50 MCG (2000 UT) tablet, Take 2,000 Units by mouth Daily., Disp: , Rfl:   •  fexofenadine (ALLEGRA) 180 MG tablet, Take 180 mg by mouth Daily., Disp: , Rfl:   •  losartan-hydrochlorothiazide (Hyzaar) 100-25 MG per tablet, Take 1 tablet by mouth Daily., Disp: 90 tablet, Rfl:  "1  •  Multiple Vitamins-Minerals (CORAL CALCIUM PLUS PO), Take 400 mg by mouth 2 (two) times a day., Disp: , Rfl:   •  multivitamin with minerals (CENTRUM SILVER 50+WOMEN PO), Take 1 tablet by mouth Daily., Disp: , Rfl:   •  potassium bicarbonate (K-LYTE) 25 MEQ disintegrating tablet, Take 25 mEq by mouth 2 (Two) Times a Day., Disp: , Rfl:   •  Specialty Vitamins Products (Biotin Plus Keratin) 03640-217 MCG-MG tablet, Take  by mouth Daily., Disp: , Rfl:   •  vitamin E 400 UNIT capsule, Take 400 Units by mouth Daily., Disp: , Rfl:   •  meclizine (ANTIVERT) 25 MG tablet, Take 1 tablet by mouth 3 (Three) Times a Day As Needed for Dizziness., Disp: 90 tablet, Rfl: 0    Allergies:   No Known Allergies    Physical Exam:  Vital Signs:   Vitals:    05/27/21 0917 05/27/21 0926   BP: 156/76 158/80   BP Location: Left arm Right arm   Patient Position: Sitting Sitting   Cuff Size: Adult Adult   Pulse: 83    Resp: 18    SpO2: 99%    Weight: 64.8 kg (142 lb 12.8 oz)    Height: 162.6 cm (64\")        Physical Exam  Constitutional:       General: She is not in acute distress.     Appearance: Normal appearance. She is well-developed. She is not diaphoretic.   HENT:      Head: Normocephalic and atraumatic.   Eyes:      General: No scleral icterus.     Pupils: Pupils are equal, round, and reactive to light.   Neck:      Trachea: No tracheal deviation.   Cardiovascular:      Rate and Rhythm: Normal rate and regular rhythm.      Heart sounds: Normal heart sounds. No murmur heard.   No friction rub. No gallop.       Comments: Normal JVD.  Pulmonary:      Effort: Pulmonary effort is normal. No respiratory distress.      Breath sounds: Normal breath sounds. No stridor. No wheezing or rales.   Chest:      Chest wall: No tenderness.   Abdominal:      General: Bowel sounds are normal. There is no distension.      Palpations: Abdomen is soft.      Tenderness: There is no abdominal tenderness. There is no guarding or rebound.   Musculoskeletal:  "        General: No swelling. Normal range of motion.      Cervical back: Neck supple. No tenderness.   Lymphadenopathy:      Cervical: No cervical adenopathy.   Skin:     General: Skin is warm and dry.      Findings: No erythema.   Neurological:      General: No focal deficit present.      Mental Status: She is alert and oriented to person, place, and time.   Psychiatric:         Mood and Affect: Mood normal.         Behavior: Behavior normal.         Results Review:   I reviewed the patient's new clinical results.  I personally viewed and interpreted the patient's EKG/Telemetry data      ECG 12 Lead    Date/Time: 5/27/2021 10:26 AM  Performed by: Jai Cardenas MD  Authorized by: Jai Cardenas MD   Comparison: not compared with previous ECG   Rhythm: sinus rhythm  Rate: normal  QRS axis: normal  Other findings: non-specific ST-T wave changes    Clinical impression: abnormal EKG            Assessment / Plan:     1. Pulmonary hypertension   --Echocardiogram in 3/21 showed mild TR with mild pulmonary hypertension, RVSP 35-45 mmHg  --Appears to be asymptomatic without exertional dyspnea  --Given pulmonary hypertension is mild and asymptomatic, no indication for further work-up at this time  --Will follow on a as needed basis    2. Essential hypertension  --Hypertensive today with systolic BP in 150s  --Will continue current antihypertensives  --Would consider the addition of Norvasc if the patient remains hypertensive upon next follow-up with PCP      Follow Up:   Return if symptoms worsen or fail to improve.      Thank you for allowing me to participate in the care of your patient. Please to not hesitate to contact me with additional questions or concerns.     KINDRA Cardenas MD  Interventional Cardiology   05/27/2021  09:50 EDT

## 2021-08-09 ENCOUNTER — OFFICE VISIT (OUTPATIENT)
Dept: INTERNAL MEDICINE | Facility: CLINIC | Age: 76
End: 2021-08-09

## 2021-08-09 VITALS
RESPIRATION RATE: 18 BRPM | HEART RATE: 105 BPM | TEMPERATURE: 98.5 F | OXYGEN SATURATION: 95 % | SYSTOLIC BLOOD PRESSURE: 140 MMHG | HEIGHT: 64 IN | DIASTOLIC BLOOD PRESSURE: 75 MMHG | BODY MASS INDEX: 24.67 KG/M2 | WEIGHT: 144.5 LBS

## 2021-08-09 DIAGNOSIS — I27.20 PULMONARY HYPERTENSION (HCC): ICD-10-CM

## 2021-08-09 DIAGNOSIS — Z00.00 MEDICARE ANNUAL WELLNESS VISIT, SUBSEQUENT: Primary | ICD-10-CM

## 2021-08-09 DIAGNOSIS — I10 ESSENTIAL HYPERTENSION: ICD-10-CM

## 2021-08-09 DIAGNOSIS — M81.0 AGE-RELATED OSTEOPOROSIS WITHOUT CURRENT PATHOLOGICAL FRACTURE: ICD-10-CM

## 2021-08-09 PROCEDURE — 96160 PT-FOCUSED HLTH RISK ASSMT: CPT | Performed by: FAMILY MEDICINE

## 2021-08-09 PROCEDURE — 1170F FXNL STATUS ASSESSED: CPT | Performed by: FAMILY MEDICINE

## 2021-08-09 PROCEDURE — G0439 PPPS, SUBSEQ VISIT: HCPCS | Performed by: FAMILY MEDICINE

## 2021-08-09 PROCEDURE — 1159F MED LIST DOCD IN RCRD: CPT | Performed by: FAMILY MEDICINE

## 2021-08-09 PROCEDURE — 99397 PER PM REEVAL EST PAT 65+ YR: CPT | Performed by: FAMILY MEDICINE

## 2021-08-09 RX ORDER — LOSARTAN POTASSIUM AND HYDROCHLOROTHIAZIDE 25; 100 MG/1; MG/1
1 TABLET ORAL DAILY
Qty: 90 TABLET | Refills: 3 | Status: SHIPPED | OUTPATIENT
Start: 2021-08-09 | End: 2021-09-07

## 2021-08-09 NOTE — PATIENT INSTRUCTIONS
Advance Directive    Advance directives are legal documents that let you make choices ahead of time about your health care and medical treatment in case you become unable to communicate for yourself. Advance directives are a way for you to make known your wishes to family, friends, and health care providers. This can let others know about your end-of-life care if you become unable to communicate.  Discussing and writing advance directives should happen over time rather than all at once. Advance directives can be changed depending on your situation and what you want, even after you have signed the advance directives.  There are different types of advance directives, such as:  · Medical power of .  · Living will.  · Do not resuscitate (DNR) or do not attempt resuscitation (DNAR) order.  Health care proxy and medical power of   A health care proxy is also called a health care agent. This is a person who is appointed to make medical decisions for you in cases where you are unable to make the decisions yourself. Generally, people choose someone they know well and trust to represent their preferences. Make sure to ask this person for an agreement to act as your proxy. A proxy may have to exercise judgment in the event of a medical decision for which your wishes are not known.  A medical power of  is a legal document that names your health care proxy. Depending on the laws in your state, after the document is written, it may also need to be:  · Signed.  · Notarized.  · Dated.  · Copied.  · Witnessed.  · Incorporated into your medical record.  You may also want to appoint someone to manage your money in a situation in which you are unable to do so. This is called a durable power of  for finances. It is a separate legal document from the durable power of  for health care. You may choose the same person or someone different from your health care proxy to act as your agent in money  matters.  If you do not appoint a proxy, or if there is a concern that the proxy is not acting in your best interests, a court may appoint a guardian to act on your behalf.  Living will  A living will is a set of instructions that state your wishes about medical care when you cannot express them yourself. Health care providers should keep a copy of your living will in your medical record. You may want to give a copy to family members or friends. To alert caregivers in case of an emergency, you can place a card in your wallet to let them know that you have a living will and where they can find it. A living will is used if you become:  · Terminally ill.  · Disabled.  · Unable to communicate or make decisions.  Items to consider in your living will include:  · To use or not to use life-support equipment, such as dialysis machines and breathing machines (ventilators).  · A DNR or DNAR order. This tells health care providers not to use cardiopulmonary resuscitation (CPR) if breathing or heartbeat stops.  · To use or not to use tube feeding.  · To be given or not to be given food and fluids.  · Comfort (palliative) care when the goal becomes comfort rather than a cure.  · Donation of organs and tissues.  A living will does not give instructions for distributing your money and property if you should pass away.  DNR or DNAR  A DNR or DNAR order is a request not to have CPR in the event that your heart stops beating or you stop breathing. If a DNR or DNAR order has not been made and shared, a health care provider will try to help any patient whose heart has stopped or who has stopped breathing. If you plan to have surgery, talk with your health care provider about how your DNR or DNAR order will be followed if problems occur.  What if I do not have an advance directive?  If you do not have an advance directive, some states assign family decision makers to act on your behalf based on how closely you are related to them. Each  state has its own laws about advance directives. You may want to check with your health care provider, , or state representative about the laws in your state.  Summary  · Advance directives are the legal documents that allow you to make choices ahead of time about your health care and medical treatment in case you become unable to tell others about your care.  · The process of discussing and writing advance directives should happen over time. You can change the advance directives, even after you have signed them.  · Advance directives include DNR or DNAR orders, living mark, and designating an agent as your medical power of .  This information is not intended to replace advice given to you by your health care provider. Make sure you discuss any questions you have with your health care provider.  Document Revised: 2021 Document Reviewed: 2020  [employer] Patient Education ©  [employer]      Medicare Wellness  Personal Prevention Plan of Service     Date of Office Visit:  2021  Encounter Provider:  Faviola Hanson DO  Place of Service:  [facility]  Patient Name: Nellie Martinez  :  1945    As part of the Medicare Wellness portion of your visit today, we are providing you with this personalized preventive plan of services (PPPS). This plan is based upon recommendations of the [place] Preventive Services Task Force (USPSTF) and the Advisory Committee on Immunization Practices (ACIP).    This lists the preventive care services that should be considered, and provides dates of when you are due. Items listed as completed are up-to-date and do not require any further intervention.    Health Maintenance   Topic Date Due   • ZOSTER VACCINE (2 of 3) 01/10/2017   • HEPATITIS C SCREENING  Never done   • INFLUENZA VACCINE  10/01/2021   • Pneumococcal Vaccine 65+ (2 of 2 - PPSV23) 10/19/2021   • ANNUAL WELLNESS VISIT  2022   • DXA SCAN   04/21/2023   • TDAP/TD VACCINES (2 - Td or Tdap) 11/25/2029   • COVID-19 Vaccine  Completed   • COLORECTAL CANCER SCREENING  Discontinued       No orders of the defined types were placed in this encounter.      Return in about 6 months (around 2/9/2022) for HTN.        Zoster Vaccine, Recombinant injection  What is this medicine?  ZOSTER VACCINE (ZOS ter vak SEEN) is a vaccine used to reduce the risk of getting shingles. This vaccine is not used to treat shingles or nerve pain from shingles.  This medicine may be used for other purposes; ask your health care provider or pharmacist if you have questions.  COMMON BRAND NAME(S): SHINGRIX  What should I tell my health care provider before I take this medicine?  They need to know if you have any of these conditions:  · cancer  · immune system problems  · an unusual or allergic reaction to Zoster vaccine, other medications, foods, dyes, or preservatives  · pregnant or trying to get pregnant  · breast-feeding  How should I use this medicine?  This vaccine is injected into a muscle. It is given by a health care provider.  A copy of Vaccine Information Statements will be given before each vaccination. Be sure to read this information carefully each time. This sheet may change often.  Talk to your health care provider about the use of this vaccine in children. This vaccine is not approved for use in children.  Overdosage: If you think you have taken too much of this medicine contact a poison control center or emergency room at once.  NOTE: This medicine is only for you. Do not share this medicine with others.  What if I miss a dose?  Keep appointments for follow-up (booster) doses. It is important not to miss your dose. Call your health care provider if you are unable to keep an appointment.  What may interact with this medicine?  · medicines that suppress your immune system  · medicines to treat cancer  · steroid medicines like prednisone or cortisone  This list may not  describe all possible interactions. Give your health care provider a list of all the medicines, herbs, non-prescription drugs, or dietary supplements you use. Also tell them if you smoke, drink alcohol, or use illegal drugs. Some items may interact with your medicine.  What should I watch for while using this medicine?  Visit your health care provider regularly.  This vaccine, like all vaccines, may not fully protect everyone.  What side effects may I notice from receiving this medicine?  Side effects that you should report to your doctor or health care professional as soon as possible:  · allergic reactions (skin rash, itching or hives; swelling of the face, lips, or tongue)  · trouble breathing  Side effects that usually do not require medical attention (report these to your doctor or health care professional if they continue or are bothersome):  · chills  · headache  · fever  · nausea  · pain, redness, or irritation at site where injected  · tiredness  · vomiting  This list may not describe all possible side effects. Call your doctor for medical advice about side effects. You may report side effects to FDA at 3-787-KZV-8526.  Where should I keep my medicine?  This vaccine is only given by a health care provider. It will not be stored at home.  NOTE: This sheet is a summary. It may not cover all possible information. If you have questions about this medicine, talk to your doctor, pharmacist, or health care provider.  © 2021 Elsevier/Gold Standard (2021-01-22 16:23:07)    Eating Plan for Osteoporosis  Osteoporosis causes your bones to become weak and brittle. This puts you at greater risk for bone breaks (fractures) from small bumps or falls. Making changes to your diet and increasing your physical activity can help strengthen your bones and improve your overall health.  Calcium and vitamin D are nutrients that play an important role in bone health. Vitamin D helps your body use calcium and strengthen bones.  Therefore, it is important to get enough calcium and vitamin D as part of your eating plan for osteoporosis.  What are tips for following this plan?  Reading food labels  · Try to get at least 1,000 milligrams (mg) of calcium each day.  · Look for foods that have at least 50 mg of calcium per serving.  · Talk with your health care provider about taking a calcium supplement if you do not get enough calcium from food.  · Do not have more than 2,500 mg of calcium each day. This is the upper limit for food and nutritional supplements combined. Too much calcium may cause constipation and prevent you from absorbing other important nutrients.  · Choose foods that contain vitamin D.  · Take a daily vitamin supplement that contains 800-1,000 international units (IU) of vitamin D. The amount may be different depending on your age, body weight, ethnicity, and where you live. Talk with your dietitian or health care provider about how much vitamin D is right for you.  · Avoid foods that have more than 300 mg of sodium per serving. Too much sodium can cause your body to lose calcium.  · Talk with your dietitian or health care provider about how much sodium you are allowed each day.  Shopping  · Do not buy foods with added salt, including:  ? Salted snacks.  ? Pickles.  ? Canned soups.  ? Canned meats.  ? Processed meats, such as noguera or cold cuts.  ? Smoked fish.  Meal planning  · Eat balanced meals that contain protein foods, fruits and vegetables, and foods rich in calcium and vitamin D.  · Eat at least 5 servings of fruits and vegetables each day.  · Eat 5-6 oz. of lean meat, poultry, fish, eggs, or beans each day.  Lifestyle  · Do not use any products that contain nicotine or tobacco, such as cigarettes and e-cigarettes. If you need help quitting, ask your health care provider.  · If your health care provider recommends that you lose weight:  ? Work with a dietitian to develop an eating plan that will help you reach your  desired weight goal.  ? Exercise for at least 30 minutes a day, 5 or more days a week, or as told by your health care provider.  · Work with a physical therapist to develop an exercise plan that includes flexibility, balance, and strength exercises.  · If you drink alcohol, limit how much you have. This means:  ? 0-1 drink a day for women.  ? 0-2 drinks a day for men.  ? Be aware of how much alcohol is in your drink. In the [place] one drink equals one typical bottle of beer (12 oz), one-half glass of wine (5 oz), or one shot of hard liquor (1½ oz).  What foods should I eat?  Foods high in calcium    · Yogurt. Yogurt with fruit.  · Milk. Evaporated skim milk. Dry milk powder.  · Calcium-fortified orange juice.  · Parmesan cheese. Part-skim ricotta cheese. Natural hard cheese. Cream cheese. Cottage cheese.  · Canned sardines. Canned salmon.  · Calcium-treated tofu. Calcium-fortified cereal bar. Calcium-fortified cereal. Calcium-fortified pasquale crackers.  · Cooked brock greens. Turnip greens. Broccoli. Kale.  · Almonds.  · White beans.  · Corn tortilla.  Foods high in vitamin D  · Cod liver oil. Fatty fish, such as tuna, mackerel, and salmon.  · Milk. Fortified soy milk. Fortified fruit juice.  · Yogurt. Margarine.  · Egg yolks.  Foods high in protein  · Beef. Lamb. Pork tenderloin.  · Chicken breast.  · Tuna (canned). Fish fillet.  · Tofu.   · Soy beans (cooked). Soy zoraida. Beans (canned or cooked).  · Cottage cheese.  · Yogurt.  · Peanut butter.  · Pumpkin seeds. Nuts. Sunflower seeds.  · Hard cheese.  · Milk or other milk products, such as soy milk.  The items listed above may not be a complete list of foods and beverages you can eat. Contact a dietitian for more options.  Summary  · Calcium and vitamin D are nutrients that play an important role in bone health and are an important part of your eating plan for osteoporosis.  · Eat balanced meals that contain protein foods, fruits and vegetables, and foods rich in  calcium and vitamin D.  · Avoid foods that have more than 300 mg of sodium per serving. Too much sodium can cause your body to lose calcium.  · Exercise is an important part of prevention and treatment of osteoporosis. Aim for at least 30 minutes a day, 5 days a week.  This information is not intended to replace advice given to you by your health care provider. Make sure you discuss any questions you have with your health care provider.  Document Revised: 02/25/2019 Document Reviewed: 02/25/2019  Elsevier Patient Education © 2021 [employer] Inc.

## 2021-08-09 NOTE — PROGRESS NOTES
The ABCs of the Annual Wellness Visit  Subsequent Medicare Wellness Visit    Chief Complaint   Patient presents with   • Medicare Wellness-subsequent     Pt would also like to discuss her BP. She checks it 3 times a week (with an automatic arm cuff) and always get a lower reading that in our office. Denies headaches or other problems related to HTN.        Subjective   History of Present Illness:  Nellie Martinez is a 75 y.o. female who presents for a Subsequent Medicare Wellness Visit.  Patient has HTN, pulmonary HTN.  Taking BP medication daily.  She has been checking BP at home and runs lower at home than it does here.     HEALTH RISK ASSESSMENT    Recent Hospitalizations:  No hospitalization(s) within the last year.    Current Medical Providers:  Patient Care Team:  Faviola Hanson DO as PCP - General (Family Medicine)    Smoking Status:  Social History     Tobacco Use   Smoking Status Former Smoker   Smokeless Tobacco Never Used   Tobacco Comment    quit 5 years ago       Alcohol Consumption:  Social History     Substance and Sexual Activity   Alcohol Use No       Depression Screen:   PHQ-2/PHQ-9 Depression Screening 8/9/2021   Little interest or pleasure in doing things 0   Feeling down, depressed, or hopeless 0   Total Score 0       Fall Risk Screen:  STEADI Fall Risk Assessment was completed, and patient is at LOW risk for falls.Assessment completed on:8/9/2021    Health Habits and Functional and Cognitive Screening:  Functional & Cognitive Status 8/9/2021   Do you have difficulty preparing food and eating? No   Do you have difficulty bathing yourself, getting dressed or grooming yourself? No   Do you have difficulty using the toilet? No   Do you have difficulty moving around from place to place? No   Do you have trouble with steps or getting out of a bed or a chair? No   Current Diet Well Balanced Diet   Dental Exam Not up to date   Eye Exam Up to date   Exercise (times per week) 7 times per week    Current Exercises Include Walking   Do you need help using the phone?  No   Are you deaf or do you have serious difficulty hearing?  No   Do you need help with transportation? No   Do you need help shopping? No   Do you need help preparing meals?  No   Do you need help with housework?  No   Do you need help with laundry? No   Do you need help taking your medications? No   Do you need help managing money? No   Do you ever drive or ride in a car without wearing a seat belt? No   Have you felt unusual stress, anger or loneliness in the last month? No   Who do you live with? Spouse   If you need help, do you have trouble finding someone available to you? No   Have you been bothered in the last four weeks by sexual problems? No   Do you have difficulty concentrating, remembering or making decisions? No         Does the patient have evidence of cognitive impairment? No    Asprin use counseling:Does not need ASA (and currently is not on it)    Age-appropriate Screening Schedule:  Refer to the list below for future screening recommendations based on patient's age, sex and/or medical conditions. Orders for these recommended tests are listed in the plan section. The patient has been provided with a written plan.    Health Maintenance   Topic Date Due   • ZOSTER VACCINE (2 of 3) 01/10/2017   • INFLUENZA VACCINE  10/01/2021   • DXA SCAN  04/21/2023   • TDAP/TD VACCINES (2 - Td or Tdap) 11/25/2029          The following portions of the patient's history were reviewed and updated as appropriate: allergies, current medications, past family history, past medical history, past social history, past surgical history and problem list.    Outpatient Medications Prior to Visit   Medication Sig Dispense Refill   • alendronate (FOSAMAX) 35 MG tablet Take 1 tablet by mouth Every 7 (Seven) Days. 15 tablet 3   • ascorbic acid (VITAMIN C) 1000 MG tablet Take 1,000 mg by mouth Daily.     • Cholecalciferol (Vitamin D3) 50 MCG (2000 UT) tablet  Take 2,000 Units by mouth Daily.     • fexofenadine (ALLEGRA) 180 MG tablet Take 180 mg by mouth Daily.     • meclizine (ANTIVERT) 25 MG tablet Take 1 tablet by mouth 3 (Three) Times a Day As Needed for Dizziness. 90 tablet 0   • Multiple Vitamins-Minerals (CORAL CALCIUM PLUS PO) Take 400 mg by mouth 2 (two) times a day.     • multivitamin with minerals (CENTRUM SILVER 50+WOMEN PO) Take 1 tablet by mouth Daily.     • potassium bicarbonate (K-LYTE) 25 MEQ disintegrating tablet Take 25 mEq by mouth 2 (Two) Times a Day.     • Specialty Vitamins Products (Biotin Plus Keratin) 68891-531 MCG-MG tablet Take  by mouth Daily.     • vitamin E 400 UNIT capsule Take 400 Units by mouth Daily.     • Calcium 500-100 MG-UNIT chewable tablet Chew 1 tablet Every 4 (Four) Hours As Needed.     • losartan-hydrochlorothiazide (Hyzaar) 100-25 MG per tablet Take 1 tablet by mouth Daily. 90 tablet 1     No facility-administered medications prior to visit.       Patient Active Problem List   Diagnosis   • Essential hypertension   • Murmur   • Right ear pain   • Dizziness   • Pulmonary hypertension (CMS/HCC)   • Age-related osteoporosis without current pathological fracture       Advanced Care Planning:  ACP discussion was held with the patient during this visit. Patient does not have an advance directive, information provided.    Review of Systems   Constitutional: Negative for chills, fatigue and fever.   HENT: Negative for congestion, postnasal drip and sore throat.    Eyes: Positive for visual disturbance (cataracts).   Respiratory: Negative for cough, shortness of breath and wheezing.    Cardiovascular: Negative for chest pain and leg swelling.   Gastrointestinal: Negative for abdominal pain, constipation, diarrhea, nausea and vomiting.   Endocrine: Positive for cold intolerance. Negative for heat intolerance.   Genitourinary: Negative for difficulty urinating and dysuria.   Musculoskeletal: Negative for arthralgias and back pain.  "  Skin: Negative for color change and rash.   Neurological: Negative for weakness, numbness and headaches.   Hematological: Does not bruise/bleed easily.   Psychiatric/Behavioral: Negative for dysphoric mood. The patient is not nervous/anxious.        Compared to one year ago, the patient feels her physical health is the same.  Compared to one year ago, the patient feels her mental health is the same.    Reviewed chart for potential of high risk medication in the elderly: yes  Reviewed chart for potential of harmful drug interactions in the elderly:yes    Objective         Vitals:    08/09/21 1247   BP: 140/75   Pulse: 105   Resp: 18   Temp: 98.5 °F (36.9 °C)   TempSrc: Temporal   SpO2: 95%   Weight: 65.5 kg (144 lb 8 oz)   Height: 162.6 cm (64.02\")   PainSc: 0-No pain       Body mass index is 24.79 kg/m².  Discussed the patient's BMI with her. The BMI is in the acceptable range.    Physical Exam  Constitutional:       General: She is not in acute distress.     Appearance: Normal appearance. She is well-developed.   HENT:      Head: Normocephalic and atraumatic.      Right Ear: Tympanic membrane and external ear normal.      Left Ear: Tympanic membrane and external ear normal.   Eyes:      Extraocular Movements: Extraocular movements intact.      Conjunctiva/sclera: Conjunctivae normal.      Pupils: Pupils are equal, round, and reactive to light.   Neck:      Vascular: No carotid bruit.   Cardiovascular:      Rate and Rhythm: Normal rate and regular rhythm.      Heart sounds: Murmur heard.     Pulmonary:      Effort: Pulmonary effort is normal. No respiratory distress.      Breath sounds: Normal breath sounds. No wheezing.   Abdominal:      General: Bowel sounds are normal. There is no distension.      Palpations: Abdomen is soft.      Tenderness: There is no abdominal tenderness.   Musculoskeletal:      Cervical back: Normal range of motion and neck supple.      Right lower leg: No edema.      Left lower leg: No " edema.   Lymphadenopathy:      Cervical: No cervical adenopathy.   Skin:     General: Skin is warm and dry.   Neurological:      Mental Status: She is alert and oriented to person, place, and time.      Cranial Nerves: No cranial nerve deficit.      Deep Tendon Reflexes: Reflexes normal (Brisk reflexes).   Psychiatric:         Mood and Affect: Mood normal.         Behavior: Behavior normal.               Assessment/Plan   Medicare Risks and Personalized Health Plan  CMS Preventative Services Quick Reference  Advance Directive Discussion  Breast Cancer/Mammogram Screening  Cardiovascular risk  Colon Cancer Screening  Depression/Dysphoria  Immunizations Discussed/Encouraged (specific immunizations; Shingrix )  Osteoporosis Risk    The above risks/problems have been discussed with the patient.  Pertinent information has been shared with the patient in the After Visit Summary.  Follow up plans and orders are seen below in the Assessment/Plan Section.    Diagnoses and all orders for this visit:    1. Medicare annual wellness visit, subsequent (Primary)    2. Essential hypertension    3. Pulmonary hypertension (CMS/[facility])    4. Age-related osteoporosis without current pathological fracture    Other orders  -     losartan-hydrochlorothiazide (Hyzaar) 100-25 MG per tablet; Take 1 tablet by mouth Daily.  Dispense: 90 tablet; Refill: 3      Refills been provided today for losartan-hydrochlorothiazide. Blood pressure is much better controlled today than it has been in the past. Patient is up-to-date on health maintenance with the exception of the Shingrix vaccine. She has had the old Zostavax vaccine years ago. She will check with the pharmacy on Shingrix administration. Discussed with patient no longer needs to have colonoscopies or mammograms. She has aged out of these preventative test. She is up-to-date on Covid vaccine, Tdap, pneumonia vaccines, flu vaccine. Patient recently had DEXA scan, which was positive for  osteoporosis.    Follow Up:  Return in about 6 months (around 2/9/2022) for HTN.     An After Visit Summary and PPPS were given to the patient.

## 2021-09-07 RX ORDER — LOSARTAN POTASSIUM AND HYDROCHLOROTHIAZIDE 25; 100 MG/1; MG/1
1 TABLET ORAL DAILY
Qty: 90 TABLET | Refills: 3 | Status: SHIPPED | OUTPATIENT
Start: 2021-09-07 | End: 2022-08-11 | Stop reason: SDUPTHER

## 2022-02-10 ENCOUNTER — OFFICE VISIT (OUTPATIENT)
Dept: INTERNAL MEDICINE | Facility: CLINIC | Age: 77
End: 2022-02-10

## 2022-02-10 VITALS
SYSTOLIC BLOOD PRESSURE: 138 MMHG | BODY MASS INDEX: 25.95 KG/M2 | WEIGHT: 152 LBS | HEART RATE: 84 BPM | TEMPERATURE: 97.1 F | OXYGEN SATURATION: 98 % | HEIGHT: 64 IN | DIASTOLIC BLOOD PRESSURE: 78 MMHG

## 2022-02-10 DIAGNOSIS — H69.91 DISORDER OF RIGHT EUSTACHIAN TUBE: ICD-10-CM

## 2022-02-10 DIAGNOSIS — I10 ESSENTIAL HYPERTENSION: Primary | ICD-10-CM

## 2022-02-10 DIAGNOSIS — Z13.220 LIPID SCREENING: ICD-10-CM

## 2022-02-10 DIAGNOSIS — M81.0 AGE-RELATED OSTEOPOROSIS WITHOUT CURRENT PATHOLOGICAL FRACTURE: ICD-10-CM

## 2022-02-10 PROCEDURE — 99214 OFFICE O/P EST MOD 30 MIN: CPT | Performed by: FAMILY MEDICINE

## 2022-02-10 RX ORDER — ALENDRONATE SODIUM 35 MG/1
35 TABLET ORAL
Qty: 15 TABLET | Refills: 3 | Status: SHIPPED | OUTPATIENT
Start: 2022-02-10 | End: 2022-08-11 | Stop reason: SDUPTHER

## 2022-02-10 NOTE — PROGRESS NOTES
Nellie Martinez is a 76 y.o. female.    Chief Complaint   Patient presents with   • Hypertension       HPI   Patient has hypertension.  They are taking Losartan and hydrochlorothiazide.  They have been compliant with medications.  The patient denies any side effects to the medication.  Blood pressure is controlled in the office today.  Blood pressure has been running 130's/60's.  They are following a low salt diet.  They are active.  Walks regularly.     Feels like her ears are clogged.  Right ear began bothering her today.     Patient has osteoporosis as well.  She is taking fosamax.  Denies side effects to the medication.      The following portions of the patient's history were reviewed and updated as appropriate: allergies, current medications, past family history, past medical history, past social history, past surgical history and problem list.     No Known Allergies      Current Outpatient Medications:   •  alendronate (FOSAMAX) 35 MG tablet, Take 1 tablet by mouth Every 7 (Seven) Days., Disp: 15 tablet, Rfl: 3  •  ascorbic acid (VITAMIN C) 1000 MG tablet, Take 1,000 mg by mouth Daily., Disp: , Rfl:   •  Cholecalciferol (Vitamin D3) 50 MCG (2000 UT) tablet, Take 2,000 Units by mouth Daily., Disp: , Rfl:   •  fexofenadine (ALLEGRA) 180 MG tablet, Take 180 mg by mouth Daily., Disp: , Rfl:   •  losartan-hydrochlorothiazide (HYZAAR) 100-25 MG per tablet, TAKE 1 TABLET BY MOUTH DAILY, Disp: 90 tablet, Rfl: 3  •  meclizine (ANTIVERT) 25 MG tablet, Take 1 tablet by mouth 3 (Three) Times a Day As Needed for Dizziness., Disp: 90 tablet, Rfl: 0  •  Multiple Vitamins-Minerals (CORAL CALCIUM PLUS PO), Take 400 mg by mouth 2 (two) times a day., Disp: , Rfl:   •  multivitamin with minerals (CENTRUM SILVER 50+WOMEN PO), Take 1 tablet by mouth Daily., Disp: , Rfl:   •  potassium bicarbonate (K-LYTE) 25 MEQ disintegrating tablet, Take 25 mEq by mouth 2 (Two) Times a Day., Disp: , Rfl:   •  Specialty Vitamins Products (Biotin  "Plus Keratin) 21667-231 MCG-MG tablet, Take  by mouth Daily., Disp: , Rfl:   •  vitamin E 400 UNIT capsule, Take 400 Units by mouth Daily., Disp: , Rfl:     ROS    Review of Systems   Constitutional: Negative for chills and fever.   HENT:        Ear fullness   Respiratory: Negative for shortness of breath.    Cardiovascular: Negative for chest pain.   Gastrointestinal: Negative for abdominal pain, constipation, diarrhea, nausea and vomiting.       Vitals:    02/10/22 1255 02/10/22 1322   BP: 148/82 138/78   Pulse: 84    Temp: 97.1 °F (36.2 °C)    SpO2: 98%    Weight: 68.9 kg (152 lb)    Height: 162.6 cm (64\")      Body mass index is 26.09 kg/m².    Physical Exam     Physical Exam  Constitutional:       General: She is not in acute distress.     Appearance: Normal appearance. She is well-developed.   HENT:      Head: Normocephalic and atraumatic.      Right Ear: External ear normal.      Left Ear: External ear normal.      Ears:      Comments: Right TM cloudy, left ear canal occluded with cerumen  Eyes:      Extraocular Movements: Extraocular movements intact.      Conjunctiva/sclera: Conjunctivae normal.   Cardiovascular:      Rate and Rhythm: Normal rate and regular rhythm.      Heart sounds: Murmur heard.       Pulmonary:      Effort: Pulmonary effort is normal. No respiratory distress.      Breath sounds: Normal breath sounds. No wheezing.   Abdominal:      General: Bowel sounds are normal. There is no distension.      Palpations: Abdomen is soft.      Tenderness: There is no abdominal tenderness.   Skin:     Coloration: Skin is not pale.   Neurological:      Mental Status: She is alert and oriented to person, place, and time.      Cranial Nerves: No cranial nerve deficit.   Psychiatric:         Mood and Affect: Mood normal.         Behavior: Behavior normal.         Assessment/Plan    Problems Addressed this Visit        Cardiac and Vasculature    Essential hypertension - Primary     Controlled on current " medication.  Continue losartan-HCTZ.          Relevant Orders    CBC & Differential    Comprehensive Metabolic Panel    Lipid Panel       ENT    Disorder of right eustachian tube     Encouraged to start allegra again.             Musculoskeletal and Injuries    Age-related osteoporosis without current pathological fracture     Stable.  Continue fosamax weekly.  Will monitor DEXA scans regularly.            Other Visit Diagnoses     Lipid screening        Relevant Orders    Lipid Panel        New Medications Ordered This Visit   Medications   • alendronate (FOSAMAX) 35 MG tablet     Sig: Take 1 tablet by mouth Every 7 (Seven) Days.     Dispense:  15 tablet     Refill:  3       No orders of the defined types were placed in this encounter.      Return in about 6 months (around 8/10/2022).    Faviola Hanson, DO

## 2022-02-11 LAB
ALBUMIN SERPL-MCNC: 4.6 G/DL (ref 3.7–4.7)
ALBUMIN/GLOB SERPL: 1.6 {RATIO} (ref 1.2–2.2)
ALP SERPL-CCNC: 68 IU/L (ref 44–121)
ALT SERPL-CCNC: 20 IU/L (ref 0–32)
AST SERPL-CCNC: 24 IU/L (ref 0–40)
BASOPHILS # BLD AUTO: 0 X10E3/UL (ref 0–0.2)
BASOPHILS NFR BLD AUTO: 1 %
BILIRUB SERPL-MCNC: 0.3 MG/DL (ref 0–1.2)
BUN SERPL-MCNC: 14 MG/DL (ref 8–27)
BUN/CREAT SERPL: 15 (ref 12–28)
CALCIUM SERPL-MCNC: 9.2 MG/DL (ref 8.7–10.3)
CHLORIDE SERPL-SCNC: 95 MMOL/L (ref 96–106)
CHOLEST SERPL-MCNC: 267 MG/DL (ref 100–199)
CO2 SERPL-SCNC: 18 MMOL/L (ref 20–29)
CREAT SERPL-MCNC: 0.93 MG/DL (ref 0.57–1)
EOSINOPHIL # BLD AUTO: 0.2 X10E3/UL (ref 0–0.4)
EOSINOPHIL NFR BLD AUTO: 2 %
ERYTHROCYTE [DISTWIDTH] IN BLOOD BY AUTOMATED COUNT: 13.2 % (ref 11.7–15.4)
GLOBULIN SER CALC-MCNC: 2.8 G/DL (ref 1.5–4.5)
GLUCOSE SERPL-MCNC: 100 MG/DL (ref 65–99)
HCT VFR BLD AUTO: 38.7 % (ref 34–46.6)
HDLC SERPL-MCNC: 75 MG/DL
HGB BLD-MCNC: 13.2 G/DL (ref 11.1–15.9)
IMM GRANULOCYTES # BLD AUTO: 0 X10E3/UL (ref 0–0.1)
IMM GRANULOCYTES NFR BLD AUTO: 0 %
LDLC SERPL CALC-MCNC: 171 MG/DL (ref 0–99)
LYMPHOCYTES # BLD AUTO: 1.6 X10E3/UL (ref 0.7–3.1)
LYMPHOCYTES NFR BLD AUTO: 24 %
MCH RBC QN AUTO: 30.2 PG (ref 26.6–33)
MCHC RBC AUTO-ENTMCNC: 34.1 G/DL (ref 31.5–35.7)
MCV RBC AUTO: 89 FL (ref 79–97)
MONOCYTES # BLD AUTO: 0.5 X10E3/UL (ref 0.1–0.9)
MONOCYTES NFR BLD AUTO: 8 %
NEUTROPHILS # BLD AUTO: 4.4 X10E3/UL (ref 1.4–7)
NEUTROPHILS NFR BLD AUTO: 65 %
PLATELET # BLD AUTO: 357 X10E3/UL (ref 150–450)
POTASSIUM SERPL-SCNC: 4.5 MMOL/L (ref 3.5–5.2)
PROT SERPL-MCNC: 7.4 G/DL (ref 6–8.5)
RBC # BLD AUTO: 4.37 X10E6/UL (ref 3.77–5.28)
SODIUM SERPL-SCNC: 131 MMOL/L (ref 134–144)
TRIGL SERPL-MCNC: 118 MG/DL (ref 0–149)
VLDLC SERPL CALC-MCNC: 21 MG/DL (ref 5–40)
WBC # BLD AUTO: 6.7 X10E3/UL (ref 3.4–10.8)

## 2022-08-11 ENCOUNTER — OFFICE VISIT (OUTPATIENT)
Dept: INTERNAL MEDICINE | Facility: CLINIC | Age: 77
End: 2022-08-11

## 2022-08-11 VITALS
SYSTOLIC BLOOD PRESSURE: 142 MMHG | HEIGHT: 64 IN | RESPIRATION RATE: 16 BRPM | BODY MASS INDEX: 26.46 KG/M2 | DIASTOLIC BLOOD PRESSURE: 80 MMHG | WEIGHT: 155 LBS | OXYGEN SATURATION: 96 % | HEART RATE: 87 BPM | TEMPERATURE: 98.2 F

## 2022-08-11 DIAGNOSIS — E87.1 HYPONATREMIA: ICD-10-CM

## 2022-08-11 DIAGNOSIS — I10 ESSENTIAL HYPERTENSION: ICD-10-CM

## 2022-08-11 DIAGNOSIS — E78.5 HYPERLIPIDEMIA, UNSPECIFIED HYPERLIPIDEMIA TYPE: Primary | ICD-10-CM

## 2022-08-11 PROCEDURE — 99214 OFFICE O/P EST MOD 30 MIN: CPT | Performed by: FAMILY MEDICINE

## 2022-08-11 RX ORDER — LOSARTAN POTASSIUM AND HYDROCHLOROTHIAZIDE 25; 100 MG/1; MG/1
1 TABLET ORAL DAILY
Qty: 90 TABLET | Refills: 3 | Status: SHIPPED | OUTPATIENT
Start: 2022-08-11 | End: 2022-09-08

## 2022-08-11 RX ORDER — ALENDRONATE SODIUM 35 MG/1
35 TABLET ORAL
Qty: 15 TABLET | Refills: 3 | Status: SHIPPED | OUTPATIENT
Start: 2022-08-11

## 2022-08-11 NOTE — PROGRESS NOTES
Nellie Martinez is a 76 y.o. female.    Chief Complaint   Patient presents with   • Hypertension       HPI   Patient has hypertension.  They are taking Losartan and hydrochlorothiazide.  They have been compliant with medications.  The patient denies any side effects to the medication.  Blood pressure is not controlled in the office today.  Blood pressure has been running good at home.      Patient has had hyperlipidemia for few years. She has been compliant with low fat diet. She is not currently on any medication.  She is due for cholesterol labs.      Patient also has a h/o hyponatremia.  She is not necessarily monitoring her sodium intake as a result.  She is due to have this rechecked.  No neurological deficit.      The following portions of the patient's history were reviewed and updated as appropriate: allergies, current medications, past family history, past medical history, past social history, past surgical history and problem list.     No Known Allergies      Current Outpatient Medications:   •  alendronate (FOSAMAX) 35 MG tablet, Take 1 tablet by mouth Every 7 (Seven) Days., Disp: 15 tablet, Rfl: 3  •  ascorbic acid (VITAMIN C) 1000 MG tablet, Take 1,000 mg by mouth Daily., Disp: , Rfl:   •  Cholecalciferol (Vitamin D3) 50 MCG (2000 UT) tablet, Take 2,000 Units by mouth Daily., Disp: , Rfl:   •  fexofenadine (ALLEGRA) 180 MG tablet, Take 180 mg by mouth Daily., Disp: , Rfl:   •  losartan-hydrochlorothiazide (HYZAAR) 100-25 MG per tablet, Take 1 tablet by mouth Daily., Disp: 90 tablet, Rfl: 3  •  meclizine (ANTIVERT) 25 MG tablet, Take 1 tablet by mouth 3 (Three) Times a Day As Needed for Dizziness., Disp: 90 tablet, Rfl: 0  •  Multiple Vitamins-Minerals (CORAL CALCIUM PLUS PO), Take 400 mg by mouth 2 (two) times a day., Disp: , Rfl:   •  multivitamin with minerals tablet tablet, Take 1 tablet by mouth Daily., Disp: , Rfl:   •  potassium bicarbonate (K-LYTE) 25 MEQ disintegrating tablet, Take 25 mEq by  "mouth 2 (Two) Times a Day., Disp: , Rfl:   •  Specialty Vitamins Products (Biotin Plus Keratin) 67539-074 MCG-MG tablet, Take  by mouth Daily., Disp: , Rfl:   •  vitamin E 400 UNIT capsule, Take 400 Units by mouth Daily., Disp: , Rfl:     ROS    Review of Systems   Constitutional: Negative for chills and fever.   Respiratory: Negative for cough and shortness of breath.    Cardiovascular: Negative for chest pain.   Gastrointestinal: Negative for diarrhea, nausea and vomiting.       Vitals:    08/11/22 1305   BP: 142/80   BP Location: Left arm   Pulse: 87   Resp: 16   Temp: 98.2 °F (36.8 °C)   TempSrc: Oral   SpO2: 96%   Weight: 70.3 kg (155 lb)   Height: 162.6 cm (64\")     Body mass index is 26.61 kg/m².    Physical Exam     Physical Exam  Constitutional:       General: She is not in acute distress.     Appearance: Normal appearance. She is well-developed.   HENT:      Head: Normocephalic and atraumatic.      Right Ear: External ear normal.      Left Ear: External ear normal.   Eyes:      Extraocular Movements: Extraocular movements intact.      Conjunctiva/sclera: Conjunctivae normal.   Cardiovascular:      Rate and Rhythm: Normal rate and regular rhythm.      Heart sounds: No murmur heard.  Pulmonary:      Effort: Pulmonary effort is normal. No respiratory distress.      Breath sounds: Normal breath sounds. No wheezing.   Abdominal:      General: Bowel sounds are normal. There is no distension.      Palpations: Abdomen is soft.      Tenderness: There is no abdominal tenderness.   Musculoskeletal:      Right lower leg: No edema.      Left lower leg: No edema.   Skin:     General: Skin is warm and dry.   Neurological:      Mental Status: She is alert and oriented to person, place, and time.      Cranial Nerves: No cranial nerve deficit.   Psychiatric:         Mood and Affect: Mood normal.         Behavior: Behavior normal.         Assessment/Plan    Problems Addressed this Visit     Essential hypertension     " Minimally elevated in office today, but patient reports good BP readings at home.  No change to medication.  Continue current dose of losartan-HCTZ.           Relevant Medications    losartan-hydrochlorothiazide (HYZAAR) 100-25 MG per tablet    Hyperlipidemia - Primary     Will obtain updated lipid panel and treat if appropriate.  Continue to monitor fatty/greasy foods.           Relevant Orders    Lipid Panel (Completed)    Hyponatremia     Will obtain updated sodium level.           Relevant Orders    Basic Metabolic Panel (Completed)          New Medications Ordered This Visit   Medications   • losartan-hydrochlorothiazide (HYZAAR) 100-25 MG per tablet     Sig: Take 1 tablet by mouth Daily.     Dispense:  90 tablet     Refill:  3   • alendronate (FOSAMAX) 35 MG tablet     Sig: Take 1 tablet by mouth Every 7 (Seven) Days.     Dispense:  15 tablet     Refill:  3       No orders of the defined types were placed in this encounter.      Return in about 4 months (around 12/11/2022) for Medicare Wellness.    Faviola Hanson, DO

## 2022-08-12 PROBLEM — E87.1 HYPONATREMIA: Status: ACTIVE | Noted: 2022-08-12

## 2022-08-12 PROBLEM — E78.5 HYPERLIPIDEMIA: Status: ACTIVE | Noted: 2022-08-12

## 2022-08-12 LAB
BUN SERPL-MCNC: 12 MG/DL (ref 8–23)
BUN/CREAT SERPL: 13.8 (ref 7–25)
CALCIUM SERPL-MCNC: 9.5 MG/DL (ref 8.6–10.5)
CHLORIDE SERPL-SCNC: 99 MMOL/L (ref 98–107)
CHOLEST SERPL-MCNC: 233 MG/DL (ref 0–200)
CO2 SERPL-SCNC: 26.6 MMOL/L (ref 22–29)
CREAT SERPL-MCNC: 0.87 MG/DL (ref 0.57–1)
EGFRCR SERPLBLD CKD-EPI 2021: 69.1 ML/MIN/1.73
GLUCOSE SERPL-MCNC: 98 MG/DL (ref 65–99)
HDLC SERPL-MCNC: 69 MG/DL (ref 40–60)
LDLC SERPL CALC-MCNC: 145 MG/DL (ref 0–100)
POTASSIUM SERPL-SCNC: 4.5 MMOL/L (ref 3.5–5.2)
SODIUM SERPL-SCNC: 135 MMOL/L (ref 136–145)
TRIGL SERPL-MCNC: 107 MG/DL (ref 0–150)
VLDLC SERPL CALC-MCNC: 19 MG/DL (ref 5–40)

## 2022-08-13 NOTE — ASSESSMENT & PLAN NOTE
Minimally elevated in office today, but patient reports good BP readings at home.  No change to medication.  Continue current dose of losartan-HCTZ.

## 2022-08-13 NOTE — ASSESSMENT & PLAN NOTE
Will obtain updated lipid panel and treat if appropriate.  Continue to monitor fatty/greasy foods.

## 2022-09-08 RX ORDER — LOSARTAN POTASSIUM AND HYDROCHLOROTHIAZIDE 25; 100 MG/1; MG/1
1 TABLET ORAL DAILY
Qty: 90 TABLET | Refills: 3 | Status: SHIPPED | OUTPATIENT
Start: 2022-09-08

## 2022-12-13 ENCOUNTER — OFFICE VISIT (OUTPATIENT)
Dept: INTERNAL MEDICINE | Facility: CLINIC | Age: 77
End: 2022-12-13

## 2022-12-13 VITALS
OXYGEN SATURATION: 98 % | TEMPERATURE: 97 F | BODY MASS INDEX: 25.95 KG/M2 | HEART RATE: 80 BPM | SYSTOLIC BLOOD PRESSURE: 122 MMHG | HEIGHT: 64 IN | DIASTOLIC BLOOD PRESSURE: 80 MMHG | WEIGHT: 152 LBS

## 2022-12-13 DIAGNOSIS — R01.1 MURMUR: ICD-10-CM

## 2022-12-13 DIAGNOSIS — I10 ESSENTIAL HYPERTENSION: ICD-10-CM

## 2022-12-13 DIAGNOSIS — I27.20 PULMONARY HYPERTENSION: ICD-10-CM

## 2022-12-13 DIAGNOSIS — Z00.00 MEDICARE ANNUAL WELLNESS VISIT, SUBSEQUENT: Primary | ICD-10-CM

## 2022-12-13 DIAGNOSIS — E87.1 HYPONATREMIA: ICD-10-CM

## 2022-12-13 DIAGNOSIS — M81.0 AGE-RELATED OSTEOPOROSIS WITHOUT CURRENT PATHOLOGICAL FRACTURE: ICD-10-CM

## 2022-12-13 DIAGNOSIS — E78.5 HYPERLIPIDEMIA, UNSPECIFIED HYPERLIPIDEMIA TYPE: ICD-10-CM

## 2022-12-13 PROCEDURE — 96160 PT-FOCUSED HLTH RISK ASSMT: CPT | Performed by: FAMILY MEDICINE

## 2022-12-13 PROCEDURE — 1170F FXNL STATUS ASSESSED: CPT | Performed by: FAMILY MEDICINE

## 2022-12-13 PROCEDURE — 1159F MED LIST DOCD IN RCRD: CPT | Performed by: FAMILY MEDICINE

## 2022-12-13 PROCEDURE — G0439 PPPS, SUBSEQ VISIT: HCPCS | Performed by: FAMILY MEDICINE

## 2022-12-13 PROCEDURE — 99397 PER PM REEVAL EST PAT 65+ YR: CPT | Performed by: FAMILY MEDICINE

## 2022-12-13 NOTE — PATIENT INSTRUCTIONS
Advance Directive  Advance directives are legal documents that allow you to make decisions about your health care and medical treatment in case you become unable to communicate for yourself. Advance directives let your wishes be known to family, friends, and health care providers.  Discussing and writing advance directives should happen over time rather than all at once. Advance directives can be changed and updated at any time. There are different types of advance directives, such as:  Medical power of .  Living will.  Do not resuscitate (DNR) order or do not attempt resuscitation (DNAR) order.  Health care proxy and medical power of   A health care proxy is also called a health care agent. This person is appointed to make medical decisions for you when you are unable to make decisions for yourself. Generally, people ask a trusted friend or family member to act as their proxy and represent their preferences. Make sure you have an agreement with your trusted person to act as your proxy. A proxy may have to make a medical decision on your behalf if your wishes are not known.  A medical power of , also called a durable power of  for health care, is a legal document that names your health care proxy. Depending on the laws in your state, the document may need to be:  Signed.  Notarized.  Dated.  Copied.  Witnessed.  Incorporated into your medical record.  You may also want to appoint a trusted person to manage your money in the event you are unable to do so. This is called a durable power of  for finances. It is a separate legal document from the durable power of  for health care. You may choose your health care proxy or someone different to act as your agent in money matters.  If you do not appoint a proxy, or there is a concern that the proxy is not acting in your best interest, a court may appoint a guardian to act on your behalf.  Living will  A living will is a set of  instructions that state your wishes about medical care when you cannot express them yourself. Health care providers should keep a copy of your living will in your medical record. You may want to give a copy to family members or friends. To alert caregivers in case of an emergency, you can place a card in your wallet to let them know that you have a living will and where they can find it. A living will is used if you become:  Terminally ill.  Disabled.  Unable to communicate or make decisions.  The following decisions should be included in your living will:  To use or not to use life support equipment, such as dialysis machines and breathing machines (ventilators).  Whether you want a DNR or DNAR order. This tells health care providers not to use cardiopulmonary resuscitation (CPR) if breathing or heartbeat stops.  To use or not to use tube feeding.  To be given or not to be given food and fluids.  Whether you want comfort (palliative) care when the goal becomes comfort rather than a cure.  Whether you want to donate your organs and tissues.  A living will does not give instructions for distributing your money and property if you should pass away.  DNR or DNAR  A DNR or DNAR order is a request not to have CPR in the event that your heart stops beating or you stop breathing. If a DNR or DNAR order has not been made and shared, a health care provider will try to help any patient whose heart has stopped or who has stopped breathing. If you plan to have surgery, talk with your health care provider about how your DNR or DNAR order will be followed if problems occur.  What if I do not have an advance directive?  Some states assign family decision makers to act on your behalf if you do not have an advance directive. Each state has its own laws about advance directives. You may want to check with your health care provider, , or state representative about the laws in your state.  Summary  Advance directives are legal  documents that allow you to make decisions about your health care and medical treatment in case you become unable to communicate for yourself.  The process of discussing and writing advance directives should happen over time. You can change and update advance directives at any time.  Advance directives may include a medical power of , a living will, and a DNR or DNAR order.  This information is not intended to replace advice given to you by your health care provider. Make sure you discuss any questions you have with your health care provider.  Document Revised: 2021 Document Reviewed: 2021  Elsevier Patient Education ©  Elsevier Inc[employer]    Medicare Wellness  Personal Prevention Plan of Service     Date of Office Visit:    Encounter Provider:  Faviola Hanson DO  Place of Service:  [facility]  Patient Name: Nellie Martinez  :  1945    As part of the Medicare Wellness portion of your visit today, we are providing you with this personalized preventive plan of services (PPPS). This plan is based upon recommendations of the [place] Preventive Services Task Force (USPSTF) and the Advisory Committee on Immunization Practices (ACIP).    This lists the preventive care services that should be considered, and provides dates of when you are due. Items listed as completed are up-to-date and do not require any further intervention.    Health Maintenance   Topic Date Due    HEPATITIS C SCREENING  Never done    ANNUAL WELLNESS VISIT  2022    ZOSTER VACCINE (2 of 3) 2022 (Originally 1/10/2017)    COVID-19 Vaccine (4 - Booster for Moderna series) 12/15/2022 (Originally 2022)    DXA SCAN  2023    LIPID PANEL  2023    TDAP/TD VACCINES (2 - Td or Tdap) 2029    INFLUENZA VACCINE  Completed    Pneumococcal Vaccine 65+  Completed       No orders of the defined types were placed in this encounter.      No follow-ups on file.

## 2022-12-13 NOTE — PROGRESS NOTES
The ABCs of the Annual Wellness Visit  Subsequent Medicare Wellness Visit    Subjective    Nellie Martinez is a 77 y.o. female who presents for a Subsequent Medicare Wellness Visit. She has a h/o HTN, HPL, pulmonary HTN, cardiac murmur, low sodium, and osteoporosis.    The following portions of the patient's history were reviewed and   updated as appropriate: allergies, current medications, past family history, past medical history, past social history, past surgical history and problem list.    Compared to one year ago, the patient feels her physical   health is the same.    Compared to one year ago, the patient feels her mental   health is the same.    Recent Hospitalizations:  She was not admitted to the hospital during the last year.       Current Medical Providers:  Patient Care Team:  Faviola Hanson DO as PCP - General (Family Medicine)    Outpatient Medications Prior to Visit   Medication Sig Dispense Refill   • alendronate (FOSAMAX) 35 MG tablet Take 1 tablet by mouth Every 7 (Seven) Days. 15 tablet 3   • ascorbic acid (VITAMIN C) 1000 MG tablet Take 1,000 mg by mouth Daily.     • Cholecalciferol (Vitamin D3) 50 MCG (2000 UT) tablet Take 2,000 Units by mouth Daily.     • fexofenadine (ALLEGRA) 180 MG tablet Take 180 mg by mouth Daily.     • losartan-hydrochlorothiazide (HYZAAR) 100-25 MG per tablet TAKE 1 TABLET BY MOUTH DAILY 90 tablet 3   • meclizine (ANTIVERT) 25 MG tablet Take 1 tablet by mouth 3 (Three) Times a Day As Needed for Dizziness. 90 tablet 0   • Multiple Vitamins-Minerals (CORAL CALCIUM PLUS PO) Take 400 mg by mouth 2 (two) times a day.     • multivitamin with minerals tablet tablet Take 1 tablet by mouth Daily.     • potassium bicarbonate (K-LYTE) 25 MEQ disintegrating tablet Take 25 mEq by mouth 2 (Two) Times a Day.     • Specialty Vitamins Products (Biotin Plus Keratin) 69372-756 MCG-MG tablet Take  by mouth Daily.     • vitamin E 400 UNIT capsule Take 400 Units by mouth Daily.    "    No facility-administered medications prior to visit.       No opioid medication identified on active medication list. I have reviewed chart for other potential  high risk medication/s and harmful drug interactions in the elderly.          Aspirin is not on active medication list.  Aspirin use is not indicated based on review of current medical condition/s. Risk of harm outweighs potential benefits.  .    Patient Active Problem List   Diagnosis   • Essential hypertension   • Murmur   • Right ear pain   • Dizziness   • Pulmonary hypertension (HCC)   • Age-related osteoporosis without current pathological fracture   • Disorder of right eustachian tube   • Hyperlipidemia   • Hyponatremia   • Medicare annual wellness visit, subsequent     Advance Care Planning  Advance Directive is not on file.  ACP discussion was held with the patient during this visit. Patient does not have an advance directive, information provided.     Objective    Vitals:    22 0957   BP: 122/80   BP Location: Left arm   Patient Position: Sitting   Cuff Size: Adult   Pulse: 80   Temp: 97 °F (36.1 °C)   SpO2: 98%   Weight: 68.9 kg (152 lb)   Height: 162.6 cm (64\")   PainSc: 0-No pain     Estimated body mass index is 26.09 kg/m² as calculated from the following:    Height as of this encounter: 162.6 cm (64\").    Weight as of this encounter: 68.9 kg (152 lb).    BMI is >= 25 and <30. (Overweight) The following options were offered after discussion;: weight loss educational material (shared in after visit summary)      Does the patient have evidence of cognitive impairment? No          HEALTH RISK ASSESSMENT    Smoking Status:  Social History     Tobacco Use   Smoking Status Former   • Packs/day: 0.50   • Years: 10.00   • Pack years: 5.00   • Types: Cigarettes   • Quit date: 2017   • Years since quittin.9   Smokeless Tobacco Never   Tobacco Comments    quit 5 years ago     Alcohol Consumption:  Social History     Substance and Sexual " Activity   Alcohol Use No     Fall Risk Screen:    YVONADI Fall Risk Assessment was completed, and patient is at LOW risk for falls.Assessment completed on:12/13/2022    Depression Screening:  PHQ-2/PHQ-9 Depression Screening 12/13/2022   Retired PHQ-9 Total Score -   Retired Total Score -   Little Interest or Pleasure in Doing Things 0-->not at all   Feeling Down, Depressed or Hopeless 0-->not at all   PHQ-9: Brief Depression Severity Measure Score 0       Health Habits and Functional and Cognitive Screening:  Functional & Cognitive Status 12/13/2022   Do you have difficulty preparing food and eating? No   Do you have difficulty bathing yourself, getting dressed or grooming yourself? No   Do you have difficulty using the toilet? No   Do you have difficulty moving around from place to place? No   Do you have trouble with steps or getting out of a bed or a chair? No   Current Diet Well Balanced Diet   Dental Exam Up to date   Eye Exam Up to date   Exercise (times per week) 4 times per week   Current Exercises Include Walking;Treadmill   Do you need help using the phone?  No   Are you deaf or do you have serious difficulty hearing?  Yes   Do you need help with transportation? No   Do you need help shopping? No   Do you need help preparing meals?  No   Do you need help with housework?  No   Do you need help with laundry? No   Do you need help taking your medications? No   Do you need help managing money? No   Do you ever drive or ride in a car without wearing a seat belt? No   Have you felt unusual stress, anger or loneliness in the last month? No   Who do you live with? Spouse   If you need help, do you have trouble finding someone available to you? No   Have you been bothered in the last four weeks by sexual problems? No   Do you have difficulty concentrating, remembering or making decisions? No       Age-appropriate Screening Schedule:  Refer to the list below for future screening recommendations based on patient's  "age, sex and/or medical conditions. Orders for these recommended tests are listed in the plan section. The patient has been provided with a written plan.    Health Maintenance   Topic Date Due   • ZOSTER VACCINE (2 of 3) 12/13/2022 (Originally 1/10/2017)   • DXA SCAN  04/21/2023   • LIPID PANEL  08/11/2023   • TDAP/TD VACCINES (2 - Td or Tdap) 11/25/2029   • INFLUENZA VACCINE  Completed                CMS Preventative Services Quick Reference  Risk Factors Identified During Encounter  Hearing Problem: known problem  Immunizations Discussed/Encouraged: Shingrix and COVID19  The above risks/problems have been discussed with the patient.  Pertinent information has been shared with the patient in the After Visit Summary.  An After Visit Summary and PPPS were made available to the patient.      Review of Systems   Constitutional: Negative for chills, fatigue and fever.   HENT: Negative for congestion and postnasal drip.    Eyes: Positive for visual disturbance (cataracts).   Respiratory: Negative for cough and shortness of breath.    Cardiovascular: Negative for chest pain.   Gastrointestinal: Negative for constipation, diarrhea, nausea and vomiting.   Genitourinary: Negative for difficulty urinating.   Musculoskeletal: Negative for arthralgias.   Skin: Negative for rash.   Neurological: Negative for weakness.   Hematological: Does not bruise/bleed easily.   Psychiatric/Behavioral: Negative for dysphoric mood. The patient is not nervous/anxious.        Objective   Vital Signs:  /80 (BP Location: Left arm, Patient Position: Sitting, Cuff Size: Adult)   Pulse 80   Temp 97 °F (36.1 °C)   Ht 162.6 cm (64\")   Wt 68.9 kg (152 lb)   SpO2 98%   BMI 26.09 kg/m²     Physical Exam  Constitutional:       General: She is not in acute distress.     Appearance: Normal appearance. She is well-developed.   HENT:      Head: Normocephalic and atraumatic.      Right Ear: Tympanic membrane and external ear normal.      Left Ear: " Tympanic membrane and external ear normal.   Eyes:      Extraocular Movements: Extraocular movements intact.      Conjunctiva/sclera: Conjunctivae normal.      Pupils: Pupils are equal, round, and reactive to light.   Neck:      Vascular: No carotid bruit.   Cardiovascular:      Rate and Rhythm: Normal rate and regular rhythm.      Heart sounds: Murmur heard.   Pulmonary:      Effort: Pulmonary effort is normal. No respiratory distress.      Breath sounds: Normal breath sounds. No wheezing.   Abdominal:      General: Bowel sounds are normal. There is no distension.      Palpations: Abdomen is soft.      Tenderness: There is no abdominal tenderness.   Musculoskeletal:      Cervical back: Normal range of motion and neck supple.      Right lower leg: No edema.      Left lower leg: No edema.   Lymphadenopathy:      Cervical: No cervical adenopathy.   Skin:     General: Skin is warm and dry.   Neurological:      Mental Status: She is alert and oriented to person, place, and time.      Cranial Nerves: No cranial nerve deficit.      Deep Tendon Reflexes: Reflexes normal.   Psychiatric:         Mood and Affect: Mood normal.                     Assessment and Plan   Diagnoses and all orders for this visit:    1. Medicare annual wellness visit, subsequent (Primary)    2. Essential hypertension    3. Hyperlipidemia, unspecified hyperlipidemia type    4. Pulmonary hypertension (HCC)    5. Murmur    6. Hyponatremia    7. Age-related osteoporosis without current pathological fracture      Discussed with patient routine health maintenance including:  Vaccines, dental/eye health, healthy diet and exercise, DEXA scan.  Encouraged to receive shingrix vaccine at local pharmacy.  Recently had bivalent COVID booster.  Will plan to order DEXA scan in the spring.  Up to date on all other health maintenance.  Not due for labs at this time.        Follow Up   Return in about 6 months (around 6/13/2023) for hypertension,  hyperlipidemia.  Patient was given instructions and counseling regarding her condition or for health maintenance advice. Please see specific information pulled into the AVS if appropriate.

## 2022-12-29 DIAGNOSIS — T25.221A PARTIAL THICKNESS BURN OF RIGHT FOOT, INITIAL ENCOUNTER: ICD-10-CM

## 2022-12-29 NOTE — TELEPHONE ENCOUNTER
Caller: Nellie Martinez    Relationship: Self    Best call back number: [phone]     Requested Prescriptions:   Requested Prescriptions     Pending Prescriptions Disp Refills   • mupirocin (BACTROBAN) 2 % ointment 30 g 0     Sig: Apply to affected area bid-tid        Pharmacy where request should be sent: [id number] [place] [address] [address] AT [facility] - [phone]  - [phone] FX     Additional details provided by patient: PATIENT RECEIVED THIS PRESCRIPTION WHEN SHE WENT TO THE HOSPITAL    Does the patient have less than a 3 day supply:  [x] Yes  [] No    Would you like a call back once the refill request has been completed: [x] Yes [] No    If the office needs to give you a call back, can they leave a voicemail: [x] Yes [] No    Cira Najera Rep   12/29/22 09:23 EST

## 2022-12-30 ENCOUNTER — TELEPHONE (OUTPATIENT)
Dept: INTERNAL MEDICINE | Facility: CLINIC | Age: 77
End: 2022-12-30
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
## 2022-12-30 NOTE — TELEPHONE ENCOUNTER
Rx Refill Note  Requested Prescriptions     Pending Prescriptions Disp Refills   • mupirocin (BACTROBAN) 2 % ointment 30 g 0     Sig: Apply to affected area bid-tid      Last office visit with prescribing clinician: 12/13/2022   Last telemedicine visit with prescribing clinician: 12/30/2022   Next office visit with prescribing clinician: 12/30/2022                         Would you like a call back once the refill request has been completed: [] Yes [] No    If the office needs to give you a call back, can they leave a voicemail: [] Yes [] No    Paige Davis MA  12/30/22, 16:13 EST

## 2023-01-05 ENCOUNTER — OFFICE VISIT (OUTPATIENT)
Dept: INTERNAL MEDICINE | Facility: CLINIC | Age: 78
End: 2023-01-05
Payer: MEDICARE

## 2023-01-05 VITALS
BODY MASS INDEX: 21.85 KG/M2 | WEIGHT: 128 LBS | HEART RATE: 94 BPM | TEMPERATURE: 97.4 F | SYSTOLIC BLOOD PRESSURE: 132 MMHG | OXYGEN SATURATION: 99 % | HEIGHT: 64 IN | DIASTOLIC BLOOD PRESSURE: 80 MMHG

## 2023-01-05 DIAGNOSIS — T25.221D PARTIAL THICKNESS BURN OF RIGHT FOOT, SUBSEQUENT ENCOUNTER: Primary | ICD-10-CM

## 2023-01-05 PROCEDURE — 99213 OFFICE O/P EST LOW 20 MIN: CPT | Performed by: NURSE PRACTITIONER

## 2023-01-05 RX ORDER — BACITRACIN ZINC AND POLYMYXIN B SULFATE 500; 1000 [USP'U]/G; [USP'U]/G
1 OINTMENT TOPICAL 2 TIMES DAILY
Qty: 30 G | Refills: 2 | Status: SHIPPED | OUTPATIENT
Start: 2023-01-05
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
## 2023-01-09 ENCOUNTER — HOSPITAL ENCOUNTER (OUTPATIENT)
Dept: PHYSICAL THERAPY | Facility: HOSPITAL | Age: 78
Setting detail: THERAPIES SERIES
Discharge: HOME | End: 2023-01-09
Payer: MEDICARE

## 2023-01-09 DIAGNOSIS — S91.301D OPEN WOUND OF RIGHT FOOT, SUBSEQUENT ENCOUNTER: Primary | ICD-10-CM

## 2023-01-09 DIAGNOSIS — T25.221D PARTIAL THICKNESS BURN OF RIGHT FOOT, SUBSEQUENT ENCOUNTER: ICD-10-CM

## 2023-01-09 PROCEDURE — 97598 DBRDMT OPN WND ADDL 20CM/<: CPT

## 2023-01-09 PROCEDURE — 97161 PT EVAL LOW COMPLEX 20 MIN: CPT

## 2023-01-09 PROCEDURE — 97597 DBRDMT OPN WND 1ST 20 CM/<: CPT
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
## 2023-01-17 ENCOUNTER — APPOINTMENT (OUTPATIENT)
Dept: PHYSICAL THERAPY | Facility: HOSPITAL | Age: 78
End: 2023-01-17
Payer: MEDICARE

## 2023-01-18 ENCOUNTER — HOSPITAL ENCOUNTER (OUTPATIENT)
Dept: PHYSICAL THERAPY | Facility: HOSPITAL | Age: 78
Setting detail: THERAPIES SERIES
Discharge: HOME | End: 2023-01-18
Payer: MEDICARE

## 2023-01-18 DIAGNOSIS — S91.301D OPEN WOUND OF RIGHT FOOT, SUBSEQUENT ENCOUNTER: Primary | ICD-10-CM

## 2023-01-18 DIAGNOSIS — T25.221D PARTIAL THICKNESS BURN OF RIGHT FOOT, SUBSEQUENT ENCOUNTER: ICD-10-CM

## 2023-01-18 PROCEDURE — 97597 DBRDMT OPN WND 1ST 20 CM/<: CPT
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
## 2023-01-24 ENCOUNTER — HOSPITAL ENCOUNTER (OUTPATIENT)
Dept: PHYSICAL THERAPY | Facility: HOSPITAL | Age: 78
Setting detail: THERAPIES SERIES
Discharge: HOME | End: 2023-01-24
Payer: MEDICARE

## 2023-01-24 DIAGNOSIS — S91.301D OPEN WOUND OF RIGHT FOOT, SUBSEQUENT ENCOUNTER: Primary | ICD-10-CM

## 2023-01-24 DIAGNOSIS — T25.221D PARTIAL THICKNESS BURN OF RIGHT FOOT, SUBSEQUENT ENCOUNTER: ICD-10-CM

## 2023-01-24 PROCEDURE — 97597 DBRDMT OPN WND 1ST 20 CM/<: CPT
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
## 2023-01-31 ENCOUNTER — HOSPITAL ENCOUNTER (OUTPATIENT)
Dept: PHYSICAL THERAPY | Facility: HOSPITAL | Age: 78
Setting detail: THERAPIES SERIES
Discharge: HOME | End: 2023-01-31
Payer: MEDICARE

## 2023-01-31 DIAGNOSIS — T25.221D PARTIAL THICKNESS BURN OF RIGHT FOOT, SUBSEQUENT ENCOUNTER: ICD-10-CM

## 2023-01-31 DIAGNOSIS — S91.301D OPEN WOUND OF RIGHT FOOT, SUBSEQUENT ENCOUNTER: Primary | ICD-10-CM

## 2023-01-31 PROCEDURE — 97597 DBRDMT OPN WND 1ST 20 CM/<: CPT

## 2023-02-07 ENCOUNTER — HOSPITAL ENCOUNTER (OUTPATIENT)
Dept: PHYSICAL THERAPY | Facility: HOSPITAL | Age: 78
Setting detail: THERAPIES SERIES
Discharge: HOME | End: 2023-02-07
Payer: MEDICARE

## 2023-02-07 DIAGNOSIS — S91.301D OPEN WOUND OF RIGHT FOOT, SUBSEQUENT ENCOUNTER: Primary | ICD-10-CM

## 2023-02-07 DIAGNOSIS — T25.221D PARTIAL THICKNESS BURN OF RIGHT FOOT, SUBSEQUENT ENCOUNTER: ICD-10-CM

## 2023-02-07 PROCEDURE — 97597 DBRDMT OPN WND 1ST 20 CM/<: CPT
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
## 2023-02-14 ENCOUNTER — HOSPITAL ENCOUNTER (OUTPATIENT)
Dept: PHYSICAL THERAPY | Facility: HOSPITAL | Age: 78
Setting detail: THERAPIES SERIES
Discharge: HOME | End: 2023-02-14
Payer: MEDICARE

## 2023-02-14 DIAGNOSIS — T25.221D PARTIAL THICKNESS BURN OF RIGHT FOOT, SUBSEQUENT ENCOUNTER: ICD-10-CM

## 2023-02-14 DIAGNOSIS — S91.301D OPEN WOUND OF RIGHT FOOT, SUBSEQUENT ENCOUNTER: Primary | ICD-10-CM

## 2023-02-14 PROCEDURE — 97597 DBRDMT OPN WND 1ST 20 CM/<: CPT
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
## 2023-02-21 ENCOUNTER — HOSPITAL ENCOUNTER (OUTPATIENT)
Dept: PHYSICAL THERAPY | Facility: HOSPITAL | Age: 78
Setting detail: THERAPIES SERIES
Discharge: HOME | End: 2023-02-21
Payer: MEDICARE

## 2023-02-21 DIAGNOSIS — T25.221D PARTIAL THICKNESS BURN OF RIGHT FOOT, SUBSEQUENT ENCOUNTER: ICD-10-CM

## 2023-02-21 DIAGNOSIS — S91.301D OPEN WOUND OF RIGHT FOOT, SUBSEQUENT ENCOUNTER: Primary | ICD-10-CM

## 2023-02-21 PROCEDURE — 97597 DBRDMT OPN WND 1ST 20 CM/<: CPT
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
## 2023-02-28 ENCOUNTER — HOSPITAL ENCOUNTER (OUTPATIENT)
Dept: PHYSICAL THERAPY | Facility: HOSPITAL | Age: 78
Setting detail: THERAPIES SERIES
Discharge: HOME | End: 2023-02-28
Payer: MEDICARE

## 2023-02-28 DIAGNOSIS — S91.301D OPEN WOUND OF RIGHT FOOT, SUBSEQUENT ENCOUNTER: Primary | ICD-10-CM

## 2023-02-28 DIAGNOSIS — T25.221D PARTIAL THICKNESS BURN OF RIGHT FOOT, SUBSEQUENT ENCOUNTER: ICD-10-CM

## 2023-02-28 PROCEDURE — 97597 DBRDMT OPN WND 1ST 20 CM/<: CPT

## 2023-04-27 ENCOUNTER — DOCUMENTATION (OUTPATIENT)
Dept: PHYSICAL THERAPY | Facility: HOSPITAL | Age: 78
End: 2023-04-27
Payer: MEDICARE

## 2023-04-27 DIAGNOSIS — S91.301D OPEN WOUND OF RIGHT FOOT, SUBSEQUENT ENCOUNTER: Primary | ICD-10-CM

## 2023-04-27 DIAGNOSIS — T25.221D PARTIAL THICKNESS BURN OF RIGHT FOOT, SUBSEQUENT ENCOUNTER: ICD-10-CM
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
## 2023-06-25 PROBLEM — T25.221A SECOND DEGREE BURN OF RIGHT FOOT: Status: ACTIVE | Noted: 2023-06-25

## 2023-07-28 ENCOUNTER — OFFICE VISIT (OUTPATIENT)
Dept: INTERNAL MEDICINE | Facility: CLINIC | Age: 78
End: 2023-07-28
Payer: MEDICARE

## 2023-07-28 VITALS
DIASTOLIC BLOOD PRESSURE: 70 MMHG | BODY MASS INDEX: 26.53 KG/M2 | HEIGHT: 64 IN | HEART RATE: 98 BPM | TEMPERATURE: 99.1 F | WEIGHT: 155.4 LBS | OXYGEN SATURATION: 94 % | SYSTOLIC BLOOD PRESSURE: 124 MMHG

## 2023-07-28 DIAGNOSIS — L29.9 PRURITUS: ICD-10-CM

## 2023-07-28 DIAGNOSIS — L29.8 PRURITIC ERYTHEMATOUS RASH: Primary | ICD-10-CM

## 2023-07-28 RX ORDER — METHYLPREDNISOLONE ACETATE 40 MG/ML
40 INJECTION, SUSPENSION INTRA-ARTICULAR; INTRALESIONAL; INTRAMUSCULAR; SOFT TISSUE ONCE
Status: COMPLETED | OUTPATIENT
Start: 2023-07-28 | End: 2023-07-28

## 2023-07-28 RX ORDER — METHYLPREDNISOLONE 4 MG/1
TABLET ORAL
Qty: 21 TABLET | Refills: 0 | Status: SHIPPED | OUTPATIENT
Start: 2023-07-28

## 2023-07-28 RX ORDER — LORATADINE 10 MG/1
10 TABLET ORAL DAILY
Qty: 30 TABLET | Refills: 0 | Status: SHIPPED | OUTPATIENT
Start: 2023-07-28

## 2023-07-28 RX ORDER — FAMOTIDINE 40 MG/1
TABLET, FILM COATED ORAL
Qty: 30 TABLET | Refills: 0 | Status: SHIPPED | OUTPATIENT
Start: 2023-07-28

## 2023-07-28 RX ADMIN — METHYLPREDNISOLONE ACETATE 40 MG: 40 INJECTION, SUSPENSION INTRA-ARTICULAR; INTRALESIONAL; INTRAMUSCULAR; SOFT TISSUE at 16:13

## 2023-07-28 NOTE — PROGRESS NOTES
Subjective   Nellie Martinez is a 77 y.o. female.     History of Present Illness  Patient presents with rash diffuse over arms chest and belly.  States it was doing pretty well on the prednisone she was given but the prednisone finished on Tuesday and the rash came back immediately on Wednesday and was worse than its ever been.  States this happened once before and she went to urgent care she was given an injection of 3 medications and felt better.  Denies any change in medications other than the steroids that she was given here.  Denies any change in detergents or lotions or sprays.    The following portions of the patient's history were reviewed and updated as appropriate: allergies, current medications, past family history, past medical history, past social history, past surgical history, and problem list.    Review of Systems   All other systems reviewed and are negative.    Objective   Physical Exam  Vitals and nursing note reviewed.   Constitutional:       Appearance: Normal appearance.   HENT:      Head: Normocephalic and atraumatic.      Right Ear: External ear normal.      Left Ear: External ear normal.      Nose: Nose normal.      Mouth/Throat:      Mouth: Mucous membranes are moist.      Pharynx: Oropharynx is clear. No oropharyngeal exudate or posterior oropharyngeal erythema.   Eyes:      Extraocular Movements: Extraocular movements intact.      Conjunctiva/sclera: Conjunctivae normal.      Pupils: Pupils are equal, round, and reactive to light.   Cardiovascular:      Rate and Rhythm: Normal rate and regular rhythm.      Pulses: Normal pulses.      Heart sounds: Normal heart sounds.   Pulmonary:      Effort: Pulmonary effort is normal.      Breath sounds: Normal breath sounds.   Abdominal:      General: Abdomen is flat. Bowel sounds are normal.      Palpations: Abdomen is soft.   Musculoskeletal:         General: Normal range of motion.      Cervical back: Normal range of motion.   Skin:     General:  Skin is warm.      Capillary Refill: Capillary refill takes less than 2 seconds.      Findings: Rash present. Rash is purpuric and urticarial.          Neurological:      General: No focal deficit present.      Mental Status: She is alert and oriented to person, place, and time. Mental status is at baseline.   Psychiatric:         Mood and Affect: Mood normal.         Behavior: Behavior normal.         Thought Content: Thought content normal.         Judgment: Judgment normal.       Assessment & Plan   Diagnoses and all orders for this visit:    1. Pruritic erythematous rash (Primary)  -     loratadine (CLARITIN) 10 MG tablet; Take 1 tablet by mouth Daily.  Dispense: 30 tablet; Refill: 0  -     famotidine (PEPCID) 40 MG tablet; 1 po daily prn itch  Dispense: 30 tablet; Refill: 0  -     methylPREDNISolone (MEDROL) 4 MG dose pack; Take as directed on package instructions.  Dispense: 21 tablet; Refill: 0  -     triamcinolone (KENALOG) 0.1 % ointment; Apply 1 application  topically to the appropriate area as directed 2 (Two) Times a Day.  Dispense: 80 g; Refill: 0  -     Ambulatory Referral to Dermatology  -     Ambulatory Referral to Allergy  -     methylPREDNISolone acetate (DEPO-medrol) injection 40 mg    2. Pruritus  -     loratadine (CLARITIN) 10 MG tablet; Take 1 tablet by mouth Daily.  Dispense: 30 tablet; Refill: 0  -     famotidine (PEPCID) 40 MG tablet; 1 po daily prn itch  Dispense: 30 tablet; Refill: 0  -     methylPREDNISolone (MEDROL) 4 MG dose pack; Take as directed on package instructions.  Dispense: 21 tablet; Refill: 0  -     triamcinolone (KENALOG) 0.1 % ointment; Apply 1 application  topically to the appropriate area as directed 2 (Two) Times a Day.  Dispense: 80 g; Refill: 0  -     methylPREDNISolone acetate (DEPO-medrol) injection 40 mg       Patient given Depo-Medrol injection here  Patient given Kenalog cream and counseled not to use on face  Prescribed Claritin, Pepcid,  methylprednisolone.  Counseled not to take the methylprednisolone until tomorrow due to have an injection today  Given self-referral referral form to allergy clinic  Referral made to dermatology.

## 2023-10-16 ENCOUNTER — TELEPHONE (OUTPATIENT)
Dept: INTERNAL MEDICINE | Facility: CLINIC | Age: 78
End: 2023-10-16
Payer: MEDICARE

## 2023-10-16 NOTE — TELEPHONE ENCOUNTER
Caller: Nellie Martinez    Relationship: Self    Best call back number: [phone]     What medication are you requesting: SOMETHING TO REPLACE LOSARTIN 100/25MG AND ALENDRONATE 35MG      If a prescription is needed, what is your preferred pharmacy and phone number: [id number] - [place], [place] - [address] AT [facility] [address] - [phone]  - [phone] FX     Additional notes:  PATIENT STATES  REQUEST PATIENTS MEDICATIONS TO BE CHANGED, PROVIDER SUSPECTS MEDICATIONS CAUSING A RASH ALL OVER PATIENTS BODY.  PATHOLOGY REPORTS WILL BE SENT TO  THAT STATES THE RASH IS DRUG RELATED.  PLEASE CALL PATIENT

## 2023-10-17 NOTE — TELEPHONE ENCOUNTER
Relayed message to patient. She said that provider is sending over pathology report.  Please call her back when you receive and have a chance to review.

## 2023-10-23 RX ORDER — LOSARTAN POTASSIUM AND HYDROCHLOROTHIAZIDE 25; 100 MG/1; MG/1
1 TABLET ORAL DAILY
Qty: 90 TABLET | Refills: 3 | Status: SHIPPED | OUTPATIENT
Start: 2023-10-23

## 2023-10-25 ENCOUNTER — TELEPHONE (OUTPATIENT)
Dept: INTERNAL MEDICINE | Facility: CLINIC | Age: 78
End: 2023-10-25
Payer: MEDICARE

## 2023-10-25 NOTE — TELEPHONE ENCOUNTER
I will not be changing the patient's blood pressure medication.  She has been on this medication several years without any problem.  I received the pathology report from her biopsy of her skin, which suggests an allergic contact rash.  Reports cannot exclude a drug reaction, but contact rashes are different than drug reactions.  It is more likely something her skin has physically come in contact with that has resulted in her her rash. I do see that she saw an allergist. However, I'm not able to see his notes.  If may be helpful to get a second opinion from a different dermatologist.  I would recommend she schedule an appointment with Dr. Liriano or JANAE in [place].

## 2023-10-26 NOTE — TELEPHONE ENCOUNTER
"\" I will not be changing the patient's blood pressure medication.  She has been on this medication several years without any problem.  I received the pathology report from her biopsy of her skin, which suggests an allergic contact rash.  Reports cannot exclude a drug reaction, but contact rashes are different than drug reactions.  It is more likely something her skin has physically come in contact with that has resulted in her her rash. I do see that she saw an allergist. However, I'm not able to see his notes.  If may be helpful to get a second opinion from a different dermatologist.  I would recommend she schedule an appointment with Dr. Liriano or JANAE in [place]. \" PER Dr. Hanson    Patient said she has been dealing with the rash for months and she has not had any changes in anything at home. She said the allergist told her \"looks like you've had a virus...\" But she does not want to see another specialist I did ask again if she would like information to reach Dr. Liriano and she stated she could get a hold of her if she needed too.     Will wait a week or two and see how she does.   Asked for information sent to [facility].   "

## 2023-10-30 ENCOUNTER — OFFICE VISIT (OUTPATIENT)
Dept: PRIMARY CARE CLINIC | Age: 78
End: 2023-10-30
Payer: MEDICARE

## 2023-10-30 ENCOUNTER — HOSPITAL ENCOUNTER (OUTPATIENT)
Facility: HOSPITAL | Age: 78
Discharge: HOME | End: 2023-10-30
Payer: MEDICARE

## 2023-10-30 VITALS
RESPIRATION RATE: 16 BRPM | OXYGEN SATURATION: 89 % | BODY MASS INDEX: 26.67 KG/M2 | DIASTOLIC BLOOD PRESSURE: 74 MMHG | SYSTOLIC BLOOD PRESSURE: 148 MMHG | HEART RATE: 144 BPM | WEIGHT: 156.2 LBS | HEIGHT: 64 IN | TEMPERATURE: 98.2 F

## 2023-10-30 DIAGNOSIS — I10 PRIMARY HYPERTENSION: ICD-10-CM

## 2023-10-30 DIAGNOSIS — R21 SEVERE RASH: Primary | ICD-10-CM

## 2023-10-30 DIAGNOSIS — R21 SEVERE RASH: ICD-10-CM

## 2023-10-30 LAB
ALBUMIN SERPL-MCNC: 4.1 G/DL (ref 3.4–4.8)
ALBUMIN/GLOB SERPL: 1.5 {RATIO} (ref 0.8–2)
ALP SERPL-CCNC: 93 U/L (ref 25–100)
ALT SERPL-CCNC: 39 U/L (ref 4–36)
ANION GAP SERPL CALCULATED.3IONS-SCNC: 12 MMOL/L (ref 3–16)
AST SERPL-CCNC: 37 U/L (ref 8–33)
BASOPHILS # BLD: 0.1 K/UL (ref 0–0.1)
BASOPHILS NFR BLD: 0.5 %
BILIRUB SERPL-MCNC: 0.4 MG/DL (ref 0.3–1.2)
BUN SERPL-MCNC: 27 MG/DL (ref 6–20)
CALCIUM SERPL-MCNC: 9.7 MG/DL (ref 8.5–10.5)
CHLORIDE SERPL-SCNC: 101 MMOL/L (ref 98–107)
CO2 SERPL-SCNC: 26 MMOL/L (ref 20–30)
CREAT SERPL-MCNC: 1.2 MG/DL (ref 0.4–1.2)
EOSINOPHIL # BLD: 0.6 K/UL (ref 0–0.4)
EOSINOPHIL NFR BLD: 5.6 %
ERYTHROCYTE [DISTWIDTH] IN BLOOD BY AUTOMATED COUNT: 15.4 % (ref 11–16)
GFR SERPLBLD CREATININE-BSD FMLA CKD-EPI: 46 ML/MIN/{1.73_M2}
GLOBULIN SER CALC-MCNC: 2.7 G/DL
GLUCOSE SERPL-MCNC: 97 MG/DL (ref 74–106)
HCT VFR BLD AUTO: 38.6 % (ref 37–47)
HGB BLD-MCNC: 12.3 G/DL (ref 11.5–16.5)
IMM GRANULOCYTES # BLD: 0.1 K/UL
IMM GRANULOCYTES NFR BLD: 1 % (ref 0–5)
LYMPHOCYTES # BLD: 0.9 K/UL (ref 1.5–4)
LYMPHOCYTES NFR BLD: 8.6 %
MCH RBC QN AUTO: 30.3 PG (ref 27–32)
MCHC RBC AUTO-ENTMCNC: 31.9 G/DL (ref 31–35)
MCV RBC AUTO: 95.1 FL (ref 80–100)
MONOCYTES # BLD: 0.9 K/UL (ref 0.2–0.8)
MONOCYTES NFR BLD: 9.1 %
NEUTROPHILS # BLD: 7.8 K/UL (ref 2–7.5)
NEUTS SEG NFR BLD: 75.2 %
PLATELET # BLD AUTO: 348 K/UL (ref 150–400)
PMV BLD AUTO: 10.2 FL (ref 6–10)
POTASSIUM SERPL-SCNC: 4.3 MMOL/L (ref 3.4–5.1)
PROT SERPL-MCNC: 6.8 G/DL (ref 6.4–8.3)
RBC # BLD AUTO: 4.06 M/UL (ref 3.8–5.8)
SODIUM SERPL-SCNC: 139 MMOL/L (ref 136–145)
WBC # BLD AUTO: 10.4 K/UL (ref 4–11)

## 2023-10-30 PROCEDURE — G8399 PT W/DXA RESULTS DOCUMENT: HCPCS | Performed by: NURSE PRACTITIONER

## 2023-10-30 PROCEDURE — G8484 FLU IMMUNIZE NO ADMIN: HCPCS | Performed by: NURSE PRACTITIONER

## 2023-10-30 PROCEDURE — 3077F SYST BP >= 140 MM HG: CPT | Performed by: NURSE PRACTITIONER

## 2023-10-30 PROCEDURE — 1123F ACP DISCUSS/DSCN MKR DOCD: CPT | Performed by: NURSE PRACTITIONER

## 2023-10-30 PROCEDURE — 1036F TOBACCO NON-USER: CPT | Performed by: NURSE PRACTITIONER

## 2023-10-30 PROCEDURE — 99204 OFFICE O/P NEW MOD 45 MIN: CPT | Performed by: NURSE PRACTITIONER

## 2023-10-30 PROCEDURE — G8427 DOCREV CUR MEDS BY ELIG CLIN: HCPCS | Performed by: NURSE PRACTITIONER

## 2023-10-30 PROCEDURE — G8419 CALC BMI OUT NRM PARAM NOF/U: HCPCS | Performed by: NURSE PRACTITIONER

## 2023-10-30 PROCEDURE — 1090F PRES/ABSN URINE INCON ASSESS: CPT | Performed by: NURSE PRACTITIONER

## 2023-10-30 PROCEDURE — 80053 COMPREHEN METABOLIC PANEL: CPT

## 2023-10-30 PROCEDURE — 3078F DIAST BP <80 MM HG: CPT | Performed by: NURSE PRACTITIONER

## 2023-10-30 PROCEDURE — 85025 COMPLETE CBC W/AUTO DIFF WBC: CPT

## 2023-10-30 RX ORDER — CETIRIZINE HYDROCHLORIDE 10 MG/1
10 TABLET ORAL DAILY
Qty: 90 TABLET | Refills: 1 | Status: SHIPPED | OUTPATIENT
Start: 2023-10-30

## 2023-10-30 RX ORDER — TRIAMCINOLONE ACETONIDE 1 MG/G
CREAM TOPICAL 2 TIMES DAILY
COMMUNITY
End: 2023-11-06 | Stop reason: ALTCHOICE

## 2023-10-30 RX ORDER — ALENDRONATE SODIUM 35 MG/1
35 TABLET ORAL
COMMUNITY
End: 2023-10-30 | Stop reason: ALTCHOICE

## 2023-10-30 RX ORDER — DIPHENHYDRAMINE HCL 25 MG
25 TABLET ORAL EVERY 4 HOURS PRN
COMMUNITY
End: 2023-10-30 | Stop reason: ALTCHOICE

## 2023-10-30 RX ORDER — HYDROXYZINE PAMOATE 25 MG/1
CAPSULE ORAL
Qty: 180 CAPSULE | Refills: 1 | Status: SHIPPED | OUTPATIENT
Start: 2023-10-30

## 2023-10-30 RX ORDER — FAMOTIDINE 20 MG/1
20 TABLET, FILM COATED ORAL 2 TIMES DAILY
Qty: 60 TABLET | Refills: 3 | Status: SHIPPED | OUTPATIENT
Start: 2023-10-30

## 2023-10-30 RX ORDER — EMOLLIENT COMBINATION NO.32
EMULSION, EXTENDED RELEASE TOPICAL
COMMUNITY
End: 2023-10-30 | Stop reason: ALTCHOICE

## 2023-10-30 RX ORDER — LORATADINE 10 MG/1
10 CAPSULE, LIQUID FILLED ORAL
COMMUNITY
End: 2023-10-30 | Stop reason: ALTCHOICE

## 2023-10-30 RX ORDER — TRIAMCINOLONE ACETONIDE 1 MG/G
CREAM TOPICAL
Qty: 60 G | Refills: 1 | Status: SHIPPED | OUTPATIENT
Start: 2023-10-30 | End: 2023-11-06 | Stop reason: ALTCHOICE

## 2023-10-30 RX ORDER — CLOBETASOL PROPIONATE 0.5 MG/G
CREAM TOPICAL 2 TIMES DAILY
COMMUNITY
End: 2023-10-30 | Stop reason: ALTCHOICE

## 2023-10-30 RX ORDER — LOSARTAN POTASSIUM AND HYDROCHLOROTHIAZIDE 25; 100 MG/1; MG/1
1 TABLET ORAL DAILY
COMMUNITY
End: 2023-10-30 | Stop reason: ALTCHOICE

## 2023-10-30 RX ORDER — AMLODIPINE BESYLATE 10 MG/1
10 TABLET ORAL DAILY
Qty: 30 TABLET | Refills: 3 | Status: SHIPPED | OUTPATIENT
Start: 2023-10-30 | End: 2023-11-07 | Stop reason: ALTCHOICE

## 2023-10-30 RX ORDER — VIT E ACET/GLY/DIMETH/WATER
LOTION (ML) TOPICAL
Qty: 473 ML | Refills: 1 | Status: SHIPPED | OUTPATIENT
Start: 2023-10-30

## 2023-10-30 ASSESSMENT — ENCOUNTER SYMPTOMS
GASTROINTESTINAL NEGATIVE: 1
ALLERGIC/IMMUNOLOGIC NEGATIVE: 1
EYES NEGATIVE: 1
SHORTNESS OF BREATH: 1

## 2023-11-05 SDOH — ECONOMIC STABILITY: FOOD INSECURITY: WITHIN THE PAST 12 MONTHS, THE FOOD YOU BOUGHT JUST DIDN'T LAST AND YOU DIDN'T HAVE MONEY TO GET MORE.: NEVER TRUE

## 2023-11-05 SDOH — ECONOMIC STABILITY: HOUSING INSECURITY
IN THE LAST 12 MONTHS, WAS THERE A TIME WHEN YOU DID NOT HAVE A STEADY PLACE TO SLEEP OR SLEPT IN A SHELTER (INCLUDING NOW)?: NO

## 2023-11-05 SDOH — ECONOMIC STABILITY: TRANSPORTATION INSECURITY
IN THE PAST 12 MONTHS, HAS LACK OF TRANSPORTATION KEPT YOU FROM MEETINGS, WORK, OR FROM GETTING THINGS NEEDED FOR DAILY LIVING?: NO

## 2023-11-05 SDOH — ECONOMIC STABILITY: FOOD INSECURITY: WITHIN THE PAST 12 MONTHS, YOU WORRIED THAT YOUR FOOD WOULD RUN OUT BEFORE YOU GOT MONEY TO BUY MORE.: NEVER TRUE

## 2023-11-05 SDOH — ECONOMIC STABILITY: INCOME INSECURITY: HOW HARD IS IT FOR YOU TO PAY FOR THE VERY BASICS LIKE FOOD, HOUSING, MEDICAL CARE, AND HEATING?: NOT HARD AT ALL

## 2023-11-05 ASSESSMENT — PATIENT HEALTH QUESTIONNAIRE - PHQ9
1. LITTLE INTEREST OR PLEASURE IN DOING THINGS: SEVERAL DAYS
SUM OF ALL RESPONSES TO PHQ QUESTIONS 1-9: 2
2. FEELING DOWN, DEPRESSED OR HOPELESS: 1
2. FEELING DOWN, DEPRESSED OR HOPELESS: SEVERAL DAYS
1. LITTLE INTEREST OR PLEASURE IN DOING THINGS: 1
SUM OF ALL RESPONSES TO PHQ9 QUESTIONS 1 & 2: 2
SUM OF ALL RESPONSES TO PHQ QUESTIONS 1-9: 2
SUM OF ALL RESPONSES TO PHQ QUESTIONS 1-9: 2
SUM OF ALL RESPONSES TO PHQ9 QUESTIONS 1 & 2: 2
SUM OF ALL RESPONSES TO PHQ QUESTIONS 1-9: 2

## 2023-11-06 ENCOUNTER — HOSPITAL ENCOUNTER (OUTPATIENT)
Facility: HOSPITAL | Age: 78
Discharge: HOME | End: 2023-11-06
Payer: MEDICARE

## 2023-11-06 ENCOUNTER — OFFICE VISIT (OUTPATIENT)
Dept: PRIMARY CARE CLINIC | Age: 78
End: 2023-11-06
Payer: MEDICARE

## 2023-11-06 ENCOUNTER — HOSPITAL ENCOUNTER (OUTPATIENT)
Dept: GENERAL RADIOLOGY | Facility: HOSPITAL | Age: 78
Discharge: HOME | End: 2023-11-06
Payer: MEDICARE

## 2023-11-06 VITALS
SYSTOLIC BLOOD PRESSURE: 150 MMHG | DIASTOLIC BLOOD PRESSURE: 74 MMHG | HEART RATE: 100 BPM | BODY MASS INDEX: 28.39 KG/M2 | WEIGHT: 165.4 LBS | OXYGEN SATURATION: 92 %

## 2023-11-06 DIAGNOSIS — F41.9 ANXIETY: ICD-10-CM

## 2023-11-06 DIAGNOSIS — L51.1 STEVENS-JOHNSON SYNDROME (HCC): Primary | ICD-10-CM

## 2023-11-06 DIAGNOSIS — R60.9 SWELLING: ICD-10-CM

## 2023-11-06 DIAGNOSIS — I10 PRIMARY HYPERTENSION: ICD-10-CM

## 2023-11-06 DIAGNOSIS — R06.02 SHORTNESS OF BREATH: ICD-10-CM

## 2023-11-06 PROCEDURE — 1123F ACP DISCUSS/DSCN MKR DOCD: CPT | Performed by: NURSE PRACTITIONER

## 2023-11-06 PROCEDURE — G8419 CALC BMI OUT NRM PARAM NOF/U: HCPCS | Performed by: NURSE PRACTITIONER

## 2023-11-06 PROCEDURE — 3078F DIAST BP <80 MM HG: CPT | Performed by: NURSE PRACTITIONER

## 2023-11-06 PROCEDURE — G8427 DOCREV CUR MEDS BY ELIG CLIN: HCPCS | Performed by: NURSE PRACTITIONER

## 2023-11-06 PROCEDURE — G8484 FLU IMMUNIZE NO ADMIN: HCPCS | Performed by: NURSE PRACTITIONER

## 2023-11-06 PROCEDURE — 1090F PRES/ABSN URINE INCON ASSESS: CPT | Performed by: NURSE PRACTITIONER

## 2023-11-06 PROCEDURE — 1036F TOBACCO NON-USER: CPT | Performed by: NURSE PRACTITIONER

## 2023-11-06 PROCEDURE — 3074F SYST BP LT 130 MM HG: CPT | Performed by: NURSE PRACTITIONER

## 2023-11-06 PROCEDURE — 99214 OFFICE O/P EST MOD 30 MIN: CPT | Performed by: NURSE PRACTITIONER

## 2023-11-06 PROCEDURE — G8399 PT W/DXA RESULTS DOCUMENT: HCPCS | Performed by: NURSE PRACTITIONER

## 2023-11-06 PROCEDURE — 71046 X-RAY EXAM CHEST 2 VIEWS: CPT

## 2023-11-06 RX ORDER — ALPRAZOLAM 0.5 MG/1
TABLET ORAL
Qty: 12 TABLET | Refills: 0 | Status: SHIPPED | OUTPATIENT
Start: 2023-11-06 | End: 2023-12-06

## 2023-11-06 RX ORDER — SPIRONOLACTONE 25 MG/1
TABLET ORAL
Qty: 30 TABLET | Refills: 0 | Status: SHIPPED | OUTPATIENT
Start: 2023-11-06

## 2023-11-06 ASSESSMENT — ENCOUNTER SYMPTOMS
SHORTNESS OF BREATH: 1
WHEEZING: 0
BACK PAIN: 0
ABDOMINAL PAIN: 0
SINUS PRESSURE: 0
SORE THROAT: 0
EYE DISCHARGE: 0
COUGH: 0
NAUSEA: 0
VOMITING: 0

## 2023-11-06 NOTE — PROGRESS NOTES
Chief Complaint   Patient presents with    1 Week Follow-up    Rash    Swelling    Shortness of Breath    Tremors     Hands    Leg Pain     BLE's       Have you seen any other physician or provider since your last visit no    Have you had any other diagnostic tests since your last visit? yes - Labs    Have you changed or stopped any medications since your last visit? no       SUBJECTIVE:    Patient ID: Rere Ramírez is a 66 y. o.female. Chief Complaint   Patient presents with    1 Week Follow-up    Rash    Swelling    Shortness of Breath    Tremors     Hands    Leg Pain     BLE's         HPI:  Patient is here today for 1 week follow-up for rash. Patient states that she has noticed a small improvement in the rash but has noticed several new issues this past week. She reports having increased swelling all over, along with leg pain, SOB and Tremors. She has noticed also a slight decrease in urine output but denies any pain or burning with urination. Her blood pressure is elevated today. She comes in and has her daughter Surya Ates on speaker phone. Her arms, face and legs are very swollen. She is very nervous and trembling today   Patient's medications, allergies, past medical, surgical, social and family histories were reviewed and updated as appropriate in electronic medical record. Outpatient Medications Marked as Taking for the 11/6/23 encounter (Office Visit) with MELISSA Nava   Medication Sig Dispense Refill    spironolactone (ALDACTONE) 25 MG tablet One tablet daily for 3 days then as needed for swelling.  30 tablet 0    ALPRAZolam (XANAX) 0.5 MG tablet One po bid prn anxiety 12 tablet 0    hydrOXYzine pamoate (VISTARIL) 25 MG capsule 1-2 po every 6 hours as needed for itching and rash 180 capsule 1    famotidine (PEPCID) 20 MG tablet Take 1 tablet by mouth 2 times daily 60 tablet 3    cetaphil (CETAPHIL) lotion Mix with triamcinolone and apply up to 3 times daily 473 mL 1

## 2023-11-07 ENCOUNTER — HOSPITAL ENCOUNTER (OUTPATIENT)
Dept: CT IMAGING | Facility: HOSPITAL | Age: 78
Discharge: HOME | End: 2023-11-07
Payer: MEDICARE

## 2023-11-07 DIAGNOSIS — R60.9 SWELLING: ICD-10-CM

## 2023-11-07 DIAGNOSIS — I10 PRIMARY HYPERTENSION: ICD-10-CM

## 2023-11-07 DIAGNOSIS — R06.02 SHORTNESS OF BREATH: ICD-10-CM

## 2023-11-07 PROCEDURE — 6360000004 HC RX CONTRAST MEDICATION: Performed by: NURSE PRACTITIONER

## 2023-11-07 PROCEDURE — 71275 CT ANGIOGRAPHY CHEST: CPT

## 2023-11-07 RX ORDER — DILTIAZEM HYDROCHLORIDE 180 MG/1
180 CAPSULE, COATED, EXTENDED RELEASE ORAL DAILY
Qty: 30 CAPSULE | Refills: 0 | Status: SHIPPED | OUTPATIENT
Start: 2023-11-07

## 2023-11-07 RX ADMIN — IOPAMIDOL 100 ML: 755 INJECTION, SOLUTION INTRAVENOUS at 11:43

## 2023-11-08 ENCOUNTER — TELEPHONE (OUTPATIENT)
Dept: PRIMARY CARE CLINIC | Age: 78
End: 2023-11-08

## 2023-11-08 DIAGNOSIS — R06.02 SHORTNESS OF BREATH: ICD-10-CM

## 2023-11-08 DIAGNOSIS — R60.9 SWELLING: ICD-10-CM

## 2023-11-08 DIAGNOSIS — I27.21 PULMONARY ARTERIAL HYPERTENSION (HCC): Primary | ICD-10-CM

## 2023-11-08 RX ORDER — BUMETANIDE 0.5 MG/1
0.5 TABLET ORAL DAILY
Qty: 30 TABLET | Refills: 3 | Status: SHIPPED | OUTPATIENT
Start: 2023-11-08

## 2023-11-08 NOTE — TELEPHONE ENCOUNTER
Viral Ramirez spoke with patient daughter, Ольга Braun. She informed her that Carmen Bernardo needs to increase her Aldactone to 50 mg a day and add Bumex . 5 mg for 30 days. Will get labs on Monday and Joy Carter is going to be contacting her between now and then.

## 2023-11-08 NOTE — TELEPHONE ENCOUNTER
Informed patient to take 2 of the 25 mg Aldactone a day and to do labs on Monday. She states she has decreased urine and has went twice today with very little urine.

## 2023-11-13 ENCOUNTER — OFFICE VISIT (OUTPATIENT)
Dept: PRIMARY CARE CLINIC | Age: 78
End: 2023-11-13
Payer: MEDICARE

## 2023-11-13 VITALS
WEIGHT: 168.2 LBS | DIASTOLIC BLOOD PRESSURE: 88 MMHG | BODY MASS INDEX: 28.87 KG/M2 | OXYGEN SATURATION: 92 % | SYSTOLIC BLOOD PRESSURE: 152 MMHG | HEART RATE: 94 BPM

## 2023-11-13 DIAGNOSIS — I10 PRIMARY HYPERTENSION: ICD-10-CM

## 2023-11-13 DIAGNOSIS — R60.9 SWELLING: ICD-10-CM

## 2023-11-13 DIAGNOSIS — R21 ERYTHEMATOUS RASH: ICD-10-CM

## 2023-11-13 DIAGNOSIS — L51.1 STEVENS-JOHNSON SYNDROME (HCC): Primary | ICD-10-CM

## 2023-11-13 PROCEDURE — 1090F PRES/ABSN URINE INCON ASSESS: CPT | Performed by: NURSE PRACTITIONER

## 2023-11-13 PROCEDURE — 3074F SYST BP LT 130 MM HG: CPT | Performed by: NURSE PRACTITIONER

## 2023-11-13 PROCEDURE — G8399 PT W/DXA RESULTS DOCUMENT: HCPCS | Performed by: NURSE PRACTITIONER

## 2023-11-13 PROCEDURE — G8484 FLU IMMUNIZE NO ADMIN: HCPCS | Performed by: NURSE PRACTITIONER

## 2023-11-13 PROCEDURE — 1123F ACP DISCUSS/DSCN MKR DOCD: CPT | Performed by: NURSE PRACTITIONER

## 2023-11-13 PROCEDURE — G8419 CALC BMI OUT NRM PARAM NOF/U: HCPCS | Performed by: NURSE PRACTITIONER

## 2023-11-13 PROCEDURE — 3078F DIAST BP <80 MM HG: CPT | Performed by: NURSE PRACTITIONER

## 2023-11-13 PROCEDURE — 29580 STRAPPING UNNA BOOT: CPT | Performed by: NURSE PRACTITIONER

## 2023-11-13 PROCEDURE — G8427 DOCREV CUR MEDS BY ELIG CLIN: HCPCS | Performed by: NURSE PRACTITIONER

## 2023-11-13 PROCEDURE — 99214 OFFICE O/P EST MOD 30 MIN: CPT | Performed by: NURSE PRACTITIONER

## 2023-11-13 PROCEDURE — 1036F TOBACCO NON-USER: CPT | Performed by: NURSE PRACTITIONER

## 2023-11-13 ASSESSMENT — ENCOUNTER SYMPTOMS
SHORTNESS OF BREATH: 0
BACK PAIN: 0
EYE DISCHARGE: 0
ABDOMINAL PAIN: 0
NAUSEA: 0
COUGH: 0
SINUS PRESSURE: 0
WHEEZING: 0
VOMITING: 0
SORE THROAT: 0

## 2023-11-13 NOTE — PROGRESS NOTES
Chief Complaint   Patient presents with    1 week follow-up    Rash    Swelling    Tremors       Have you seen any other physician or provider since your last visit Yes - Cardiology    Have you had any other diagnostic tests since your last visit? yes - CT, Labs and Xray    Have you changed or stopped any medications since your last visit? no     SUBJECTIVE:    Patient ID: Jason Barriga is a 66 y. o.female. Chief Complaint   Patient presents with    1 week follow-up    Rash    Swelling    Tremors         HPI:  Patient is here today for 1 week follow-up for rash. Patient continues to see some improvements but her skin continues to be extremely red. She is still having swelling in her legs and states that they are oozing today. Her blood pressure is elevated today. Patient's medications, allergies, past medical, surgical, social and family histories were reviewed and updated as appropriate in electronic medical record. Outpatient Medications Marked as Taking for the 11/13/23 encounter (Office Visit) with MELISSA Barry   Medication Sig Dispense Refill    [DISCONTINUED] bumetanide (BUMEX) 0.5 MG tablet Take 1 tablet by mouth daily 30 tablet 3    [DISCONTINUED] dilTIAZem (CARDIZEM CD) 180 MG extended release capsule Take 1 capsule by mouth daily (Patient not taking: Reported on 11/17/2023) 30 capsule 0    spironolactone (ALDACTONE) 25 MG tablet One tablet daily for 3 days then as needed for swelling.  (Patient taking differently: Take 2 tablets by mouth daily One tablet daily for 3 days then as needed for swelling.) 30 tablet 0    [DISCONTINUED] ALPRAZolam (XANAX) 0.5 MG tablet One po bid prn anxiety (Patient not taking: Reported on 11/17/2023) 12 tablet 0    hydrOXYzine pamoate (VISTARIL) 25 MG capsule 1-2 po every 6 hours as needed for itching and rash 180 capsule 1    famotidine (PEPCID) 20 MG tablet Take 1 tablet by mouth 2 times daily 60 tablet 3    cetaphil (CETAPHIL) lotion Mix with

## 2023-11-17 ENCOUNTER — HOSPITAL ENCOUNTER (EMERGENCY)
Facility: HOSPITAL | Age: 78
Discharge: OTHER INSTITUTION | End: 2023-11-17
Attending: EMERGENCY MEDICINE
Payer: MEDICARE

## 2023-11-17 ENCOUNTER — TELEPHONE (OUTPATIENT)
Dept: PRIMARY CARE CLINIC | Age: 78
End: 2023-11-17

## 2023-11-17 ENCOUNTER — TELEPHONE (OUTPATIENT)
Dept: INTERNAL MEDICINE | Age: 78
End: 2023-11-17

## 2023-11-17 VITALS
WEIGHT: 150 LBS | HEIGHT: 64 IN | SYSTOLIC BLOOD PRESSURE: 150 MMHG | RESPIRATION RATE: 18 BRPM | BODY MASS INDEX: 25.61 KG/M2 | OXYGEN SATURATION: 96 % | HEART RATE: 77 BPM | TEMPERATURE: 98.2 F | DIASTOLIC BLOOD PRESSURE: 73 MMHG

## 2023-11-17 DIAGNOSIS — L51.2: Primary | ICD-10-CM

## 2023-11-17 DIAGNOSIS — N17.9 AKI (ACUTE KIDNEY INJURY): ICD-10-CM

## 2023-11-17 LAB
ALBUMIN SERPL-MCNC: 2.8 G/DL (ref 3.5–5.2)
ALBUMIN/GLOB SERPL: 0.9 G/DL
ALP SERPL-CCNC: 285 U/L (ref 39–117)
ALT SERPL W P-5'-P-CCNC: 40 U/L (ref 1–33)
ANION GAP SERPL CALCULATED.3IONS-SCNC: 10.5 MMOL/L (ref 5–15)
AST SERPL-CCNC: 34 U/L (ref 1–32)
BASOPHILS # BLD AUTO: 0.1 10*3/MM3 (ref 0–0.2)
BASOPHILS NFR BLD AUTO: 0.8 % (ref 0–1.5)
BILIRUB SERPL-MCNC: 0.3 MG/DL (ref 0–1.2)
BUN SERPL-MCNC: 36 MG/DL (ref 8–23)
BUN/CREAT SERPL: 22.2 (ref 7–25)
CALCIUM SPEC-SCNC: 8.2 MG/DL (ref 8.6–10.5)
CHLORIDE SERPL-SCNC: 101 MMOL/L (ref 98–107)
CO2 SERPL-SCNC: 23.5 MMOL/L (ref 22–29)
CREAT SERPL-MCNC: 1.62 MG/DL (ref 0.57–1)
CRP SERPL-MCNC: 13.41 MG/DL (ref 0–0.5)
D-LACTATE SERPL-SCNC: 2 MMOL/L (ref 0.5–2)
DEPRECATED RDW RBC AUTO: 49.7 FL (ref 37–54)
EGFRCR SERPLBLD CKD-EPI 2021: 32.4 ML/MIN/1.73
EOSINOPHIL # BLD AUTO: 1.16 10*3/MM3 (ref 0–0.4)
EOSINOPHIL NFR BLD AUTO: 9.1 % (ref 0.3–6.2)
ERYTHROCYTE [DISTWIDTH] IN BLOOD BY AUTOMATED COUNT: 15.2 % (ref 12.3–15.4)
ERYTHROCYTE [SEDIMENTATION RATE] IN BLOOD: 9 MM/HR (ref 0–30)
GLOBULIN UR ELPH-MCNC: 3.2 GM/DL
GLUCOSE SERPL-MCNC: 104 MG/DL (ref 65–99)
HCT VFR BLD AUTO: 34.3 % (ref 34–46.6)
HGB BLD-MCNC: 11.6 G/DL (ref 12–15.9)
IMM GRANULOCYTES # BLD AUTO: 0.47 10*3/MM3 (ref 0–0.05)
IMM GRANULOCYTES NFR BLD AUTO: 3.7 % (ref 0–0.5)
LYMPHOCYTES # BLD AUTO: 1.83 10*3/MM3 (ref 0.7–3.1)
LYMPHOCYTES NFR BLD AUTO: 14.4 % (ref 19.6–45.3)
MAGNESIUM SERPL-MCNC: 2.2 MG/DL (ref 1.6–2.4)
MCH RBC QN AUTO: 30.2 PG (ref 26.6–33)
MCHC RBC AUTO-ENTMCNC: 33.8 G/DL (ref 31.5–35.7)
MCV RBC AUTO: 89.3 FL (ref 79–97)
MONOCYTES # BLD AUTO: 0.66 10*3/MM3 (ref 0.1–0.9)
MONOCYTES NFR BLD AUTO: 5.2 % (ref 5–12)
NEUTROPHILS NFR BLD AUTO: 66.8 % (ref 42.7–76)
NEUTROPHILS NFR BLD AUTO: 8.46 10*3/MM3 (ref 1.7–7)
NRBC BLD AUTO-RTO: 0 /100 WBC (ref 0–0.2)
PLATELET # BLD AUTO: 261 10*3/MM3 (ref 140–450)
PMV BLD AUTO: 8.6 FL (ref 6–12)
POTASSIUM SERPL-SCNC: 4.8 MMOL/L (ref 3.5–5.2)
PROCALCITONIN SERPL-MCNC: 0.66 NG/ML (ref 0–0.25)
PROT SERPL-MCNC: 6 G/DL (ref 6–8.5)
RBC # BLD AUTO: 3.84 10*6/MM3 (ref 3.77–5.28)
SODIUM SERPL-SCNC: 135 MMOL/L (ref 136–145)
WBC NRBC COR # BLD AUTO: 12.68 10*3/MM3 (ref 3.4–10.8)

## 2023-11-17 PROCEDURE — 99284 EMERGENCY DEPT VISIT MOD MDM: CPT

## 2023-11-17 PROCEDURE — 80053 COMPREHEN METABOLIC PANEL: CPT | Performed by: EMERGENCY MEDICINE

## 2023-11-17 PROCEDURE — 96374 THER/PROPH/DIAG INJ IV PUSH: CPT

## 2023-11-17 PROCEDURE — 83735 ASSAY OF MAGNESIUM: CPT | Performed by: EMERGENCY MEDICINE

## 2023-11-17 PROCEDURE — 25010000002 DEXAMETHASONE SODIUM PHOSPHATE 10 MG/ML SOLUTION: Performed by: EMERGENCY MEDICINE

## 2023-11-17 PROCEDURE — 85025 COMPLETE CBC W/AUTO DIFF WBC: CPT | Performed by: EMERGENCY MEDICINE

## 2023-11-17 PROCEDURE — 84145 PROCALCITONIN (PCT): CPT | Performed by: EMERGENCY MEDICINE

## 2023-11-17 PROCEDURE — 25810000003 LACTATED RINGERS SOLUTION: Performed by: EMERGENCY MEDICINE

## 2023-11-17 PROCEDURE — 86140 C-REACTIVE PROTEIN: CPT | Performed by: EMERGENCY MEDICINE

## 2023-11-17 PROCEDURE — 83605 ASSAY OF LACTIC ACID: CPT | Performed by: EMERGENCY MEDICINE

## 2023-11-17 PROCEDURE — 85652 RBC SED RATE AUTOMATED: CPT | Performed by: EMERGENCY MEDICINE

## 2023-11-17 RX ORDER — BUMETANIDE 1 MG/1
TABLET ORAL
COMMUNITY
Start: 2023-11-09

## 2023-11-17 RX ORDER — CARVEDILOL 3.12 MG/1
3.12 TABLET ORAL 2 TIMES DAILY WITH MEALS
COMMUNITY
Start: 2023-11-09

## 2023-11-17 RX ORDER — SODIUM CHLORIDE 0.9 % (FLUSH) 0.9 %
10 SYRINGE (ML) INJECTION AS NEEDED
Status: DISCONTINUED | OUTPATIENT
Start: 2023-11-17 | End: 2023-11-17 | Stop reason: HOSPADM

## 2023-11-17 RX ORDER — DEXAMETHASONE SODIUM PHOSPHATE 10 MG/ML
10 INJECTION, SOLUTION INTRAMUSCULAR; INTRAVENOUS ONCE
Status: COMPLETED | OUTPATIENT
Start: 2023-11-17 | End: 2023-11-17

## 2023-11-17 RX ADMIN — DEXAMETHASONE SODIUM PHOSPHATE 10 MG: 10 INJECTION INTRAMUSCULAR; INTRAVENOUS at 14:31

## 2023-11-17 RX ADMIN — SODIUM CHLORIDE, POTASSIUM CHLORIDE, SODIUM LACTATE AND CALCIUM CHLORIDE 1000 ML: 600; 310; 30; 20 INJECTION, SOLUTION INTRAVENOUS at 13:28

## 2023-11-17 NOTE — ED NOTES
Called [employer] [facility] at this time requesting flight transportation to [facility]. Awaiting response at this time.

## 2023-11-17 NOTE — ED NOTES
[facility] called at this time w/ number for RN to provide report. Passed number on to DAYRON Pires
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
## 2023-11-17 NOTE — ED NOTES
Called [facility] at this time requesting transportation to [facility] at this time. Was connected to their supervisor. Transferred call to MD Ramses.

## 2023-11-17 NOTE — ED NOTES
[facility] [facility] Supervisor approved pt transport to [facility]. Called dispatch and requested non emergent transport to [facility] at this time. TT notified
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
## 2023-11-17 NOTE — ED NOTES
Called  [facility] at this time per MD Ramses request. Was connected to Dr. Montes. Call transferred.

## 2023-11-17 NOTE — ED NOTES
Called [employer] at this time requesting flight transportation to [facility]. Declined due to weather. Will not be able to check weather again until 0100. TT notified.

## 2023-11-17 NOTE — ED NOTES
[facility] called at this time to notify us they have accepted this pt for flight transportation to [facility] emergency. They will arrive in approximately 28 minutes. TT notified.

## 2023-11-17 NOTE — ED NOTES
Called [facility] at this time per MD Ramses request. Was connected to [facility]. Call transferred.

## 2023-11-17 NOTE — ED NOTES
Called [facility] at this time requesting air transport to [facility]. They stated they would check weather status and call us back.
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
## 2023-11-17 NOTE — TELEPHONE ENCOUNTER
Patient has been having more swelling in her face,hands, and legs since Wednesday. She had stopped the Xanax because it made her sleep all day but her  had given her a half last night. Patient removed the Nirali boots yesterday due to increased swelling. Her fluid pills have been increased and her blood pressure medication was changed to Coreg. She has an appointment with Dermatology this morning at 10:45. Daughter will call the office after her visit today.
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
## 2023-11-21 ENCOUNTER — CARE COORDINATION (OUTPATIENT)
Dept: PRIMARY CARE CLINIC | Age: 78
End: 2023-11-21

## 2023-11-28 DIAGNOSIS — M25.50 POLYARTHRALGIA: Primary | ICD-10-CM

## 2023-11-28 RX ORDER — CARBOXYMETHYLCELLULOSE SODIUM 5 MG/ML
2 SOLUTION/ DROPS OPHTHALMIC PRN
COMMUNITY
Start: 2023-11-20

## 2023-11-28 RX ORDER — ERYTHROMYCIN 5 MG/G
OINTMENT OPHTHALMIC
COMMUNITY
Start: 2023-11-20

## 2023-11-28 RX ORDER — HYDROXYZINE HYDROCHLORIDE 25 MG/1
TABLET, FILM COATED ORAL
COMMUNITY
Start: 2023-11-20

## 2023-11-28 RX ORDER — IBUPROFEN/PSEUDOEPHEDRINE HCL 200MG-30MG
1 TABLET ORAL DAILY
COMMUNITY
Start: 2023-11-21

## 2023-11-28 RX ORDER — TRAMADOL HYDROCHLORIDE 50 MG/1
50 TABLET ORAL EVERY 6 HOURS PRN
Qty: 28 TABLET | Refills: 0 | Status: SHIPPED | OUTPATIENT
Start: 2023-11-28 | End: 2023-12-12

## 2023-11-28 NOTE — CARE COORDINATION
Care Transitions Initial Follow Up Call    Call within 2 business days of discharge: Yes     Patient: Kay Wood Patient : 1945 MRN: [id number]    Last Discharge Facility       None            RARS: No data recorded     Spoke with: Attempting HFU call, unsuccessful. Message left with contact information.      Discharge department/facility: [facility]    Non-face-to-face services provided:  Scheduled appointment with PCP-Nataly  Obtained and reviewed discharge summary and/or continuity of care documents    Follow Up  Future Appointments   Date Time Provider [address] [place] [address]   2023 10:00 AM Vito Naylor, [address] [facility]       Prieto Hall RN
Date Time Provider [address]   12/5/2023 10:00 AM MELISSA Elena [facility]       Care Transition Nurse provided contact information. Plan for follow-up call in 5-7 days based on severity of symptoms and risk factors. Plan for next call: symptom management-Pain and skin discomfort.      Lea Escamilla RN

## 2023-12-03 DIAGNOSIS — L51.1 STEVENS-JOHNSON SYNDROME (HCC): ICD-10-CM

## 2023-12-04 RX ORDER — SPIRONOLACTONE 25 MG/1
TABLET ORAL
Qty: 90 TABLET | Refills: 1 | Status: SHIPPED | OUTPATIENT
Start: 2023-12-04

## 2023-12-05 ENCOUNTER — TELEMEDICINE (OUTPATIENT)
Dept: PRIMARY CARE CLINIC | Age: 78
End: 2023-12-05
Payer: MEDICARE

## 2023-12-05 DIAGNOSIS — R68.89 TEMPERATURE INTOLERANCE: ICD-10-CM

## 2023-12-05 DIAGNOSIS — R53.1 WEAKNESS: Primary | ICD-10-CM

## 2023-12-05 DIAGNOSIS — E53.9 VITAMIN B DEFICIENCY: ICD-10-CM

## 2023-12-05 DIAGNOSIS — L51.1 STEVENS-JOHNSON SYNDROME (HCC): ICD-10-CM

## 2023-12-05 DIAGNOSIS — R53.81 DEBILITY: ICD-10-CM

## 2023-12-05 DIAGNOSIS — I10 PRIMARY HYPERTENSION: ICD-10-CM

## 2023-12-05 DIAGNOSIS — E55.9 VITAMIN D DEFICIENCY: ICD-10-CM

## 2023-12-05 DIAGNOSIS — R21 ERYTHEMATOUS RASH: ICD-10-CM

## 2023-12-05 PROCEDURE — G8419 CALC BMI OUT NRM PARAM NOF/U: HCPCS | Performed by: NURSE PRACTITIONER

## 2023-12-05 PROCEDURE — G8399 PT W/DXA RESULTS DOCUMENT: HCPCS | Performed by: NURSE PRACTITIONER

## 2023-12-05 PROCEDURE — G8484 FLU IMMUNIZE NO ADMIN: HCPCS | Performed by: NURSE PRACTITIONER

## 2023-12-05 PROCEDURE — 1123F ACP DISCUSS/DSCN MKR DOCD: CPT | Performed by: NURSE PRACTITIONER

## 2023-12-05 PROCEDURE — G8427 DOCREV CUR MEDS BY ELIG CLIN: HCPCS | Performed by: NURSE PRACTITIONER

## 2023-12-05 PROCEDURE — 99214 OFFICE O/P EST MOD 30 MIN: CPT | Performed by: NURSE PRACTITIONER

## 2023-12-05 PROCEDURE — 1090F PRES/ABSN URINE INCON ASSESS: CPT | Performed by: NURSE PRACTITIONER

## 2023-12-05 PROCEDURE — 1036F TOBACCO NON-USER: CPT | Performed by: NURSE PRACTITIONER

## 2023-12-05 RX ORDER — TRIAMCINOLONE ACETONIDE 1 MG/G
OINTMENT TOPICAL 2 TIMES DAILY
Status: ON HOLD | COMMUNITY
Start: 2023-11-20

## 2023-12-05 NOTE — PROGRESS NOTES
Chief Complaint   Patient presents with    Follow-Up from Hospital       Have you seen any other physician or provider since your last visit yes - Alma Rubi     Have you had any other diagnostic tests since your last visit? yes - Pathology    Have you changed or stopped any medications since your last visit? no     Patient is here to follow up on hospital admission at [facility]. She has been very weak. She stays in the bed a lot and sleeps a lot. She has a walker to use if she chooses. She has chills and hot flashes. She has sores on the heels of her feet that are peeling. The redness on her body has gotten better. She is asking for Home Health to work on strengthening. She is asking if she still needs to use the Triamcinolone. Post-Discharge Transitional Care Management Services      Stefani Muller   YOB: 1945    Date of Visit:  12/5/2023  30 Day Post-Discharge Date: 12/20/2023    Allergies   Allergen Reactions    Losartan Potassium-Hctz      Bhavana Honour syndrome     Outpatient Medications Marked as Taking for the 12/5/23 encounter (Telemedicine) with MELISSA Cary   Medication Sig Dispense Refill    triamcinolone (KENALOG) 0.1 % ointment Apply topically 2 times daily      Melatonin 3 MG TBDP Take 1 tablet by mouth daily      hydrOXYzine pamoate (VISTARIL) 25 MG capsule 1-2 po every 6 hours as needed for itching and rash 180 capsule 1    famotidine (PEPCID) 20 MG tablet Take 1 tablet by mouth 2 times daily 60 tablet 3    cetirizine (ZYRTEC) 10 MG tablet Take 1 tablet by mouth daily 90 tablet 1         There were no vitals filed for this visit. There is no height or weight on file to calculate BMI.      Wt Readings from Last 3 Encounters:   11/13/23 76.3 kg (168 lb 3.2 oz)   11/06/23 75 kg (165 lb 6.4 oz)   10/30/23 70.9 kg (156 lb 3.2 oz)     BP Readings from Last 3 Encounters:   11/13/23 (!) 152/88   11/06/23 (!) 150/74   10/30/23 (!) 148/74        Patient was

## 2023-12-11 ENCOUNTER — TELEPHONE (OUTPATIENT)
Dept: INTERNAL MEDICINE | Age: 78
End: 2023-12-11

## 2023-12-11 NOTE — TELEPHONE ENCOUNTER
Patient sister, Ac Urias called this morning asking about when [facility] was coming. She said that patient has no strength to even pull herself up. She has tried to use a walker but is not very successful. She fell off the toilet over the weekend. She is refusing to wrap her back,thighs, and feet and has not been eating.

## 2023-12-15 ENCOUNTER — LAB REQUISITION (OUTPATIENT)
Dept: LAB | Facility: HOSPITAL | Age: 78
End: 2023-12-15
Payer: MEDICARE

## 2023-12-15 ENCOUNTER — HOSPITAL ENCOUNTER (INPATIENT)
Facility: HOSPITAL | Age: 78
LOS: 9 days | Discharge: ANOTHER HOSPITAL | DRG: 947 | End: 2023-12-24
Attending: INTERNAL MEDICINE | Admitting: INTERNAL MEDICINE
Payer: MEDICARE

## 2023-12-15 DIAGNOSIS — I10 ESSENTIAL (PRIMARY) HYPERTENSION: ICD-10-CM

## 2023-12-15 DIAGNOSIS — L53.9 ERYTHRODERMA: Primary | ICD-10-CM

## 2023-12-15 DIAGNOSIS — L51.1 STEVENS-JOHNSON SYNDROME: ICD-10-CM

## 2023-12-15 LAB
ALBUMIN SERPL-MCNC: 2.5 G/DL (ref 3.5–5.2)
ALBUMIN/GLOB SERPL: 0.6 G/DL
ALP SERPL-CCNC: 878 U/L (ref 39–117)
ALT SERPL W P-5'-P-CCNC: 24 U/L (ref 1–33)
ANION GAP SERPL CALCULATED.3IONS-SCNC: 11.4 MMOL/L (ref 5–15)
AST SERPL-CCNC: 33 U/L (ref 1–32)
BASOPHILS # BLD AUTO: 0.11 10*3/MM3 (ref 0–0.2)
BASOPHILS NFR BLD AUTO: 0.6 % (ref 0–1.5)
BILIRUB SERPL-MCNC: 0.5 MG/DL (ref 0–1.2)
BUN SERPL-MCNC: 22 MG/DL (ref 8–23)
BUN/CREAT SERPL: 30.6 (ref 7–25)
CALCIUM SPEC-SCNC: 8.2 MG/DL (ref 8.6–10.5)
CHLORIDE SERPL-SCNC: 104 MMOL/L (ref 98–107)
CO2 SERPL-SCNC: 21.6 MMOL/L (ref 22–29)
CREAT SERPL-MCNC: 0.72 MG/DL (ref 0.57–1)
DEPRECATED RDW RBC AUTO: 49.7 FL (ref 37–54)
EGFRCR SERPLBLD CKD-EPI 2021: 85.7 ML/MIN/1.73
EOSINOPHIL # BLD AUTO: 0.35 10*3/MM3 (ref 0–0.4)
EOSINOPHIL NFR BLD AUTO: 1.9 % (ref 0.3–6.2)
ERYTHROCYTE [DISTWIDTH] IN BLOOD BY AUTOMATED COUNT: 15.9 % (ref 12.3–15.4)
FLUAV AG NPH QL: NEGATIVE
FLUBV AG NPH QL: NEGATIVE
GLOBULIN UR ELPH-MCNC: 4.5 GM/DL
GLUCOSE SERPL-MCNC: 95 MG/DL (ref 65–99)
HCT VFR BLD AUTO: 33.2 % (ref 34–46.6)
HGB BLD-MCNC: 10.7 G/DL (ref 12–15.9)
IMM GRANULOCYTES # BLD AUTO: 0.66 10*3/MM3 (ref 0–0.05)
IMM GRANULOCYTES NFR BLD AUTO: 3.6 % (ref 0–0.5)
LYMPHOCYTES # BLD AUTO: 2.06 10*3/MM3 (ref 0.7–3.1)
LYMPHOCYTES NFR BLD AUTO: 11.2 % (ref 19.6–45.3)
MCH RBC QN AUTO: 28 PG (ref 26.6–33)
MCHC RBC AUTO-ENTMCNC: 32.2 G/DL (ref 31.5–35.7)
MCV RBC AUTO: 86.9 FL (ref 79–97)
MONOCYTES # BLD AUTO: 0.68 10*3/MM3 (ref 0.1–0.9)
MONOCYTES NFR BLD AUTO: 3.7 % (ref 5–12)
NEUTROPHILS NFR BLD AUTO: 14.51 10*3/MM3 (ref 1.7–7)
NEUTROPHILS NFR BLD AUTO: 79 % (ref 42.7–76)
NRBC BLD AUTO-RTO: 0 /100 WBC (ref 0–0.2)
PLATELET # BLD AUTO: 487 10*3/MM3 (ref 140–450)
PMV BLD AUTO: 10 FL (ref 6–12)
POTASSIUM SERPL-SCNC: 4.6 MMOL/L (ref 3.5–5.2)
PROT SERPL-MCNC: 7 G/DL (ref 6–8.5)
RBC # BLD AUTO: 3.82 10*6/MM3 (ref 3.77–5.28)
SARS-COV-2 RDRP RESP QL NAA+PROBE: NOT DETECTED
SODIUM SERPL-SCNC: 137 MMOL/L (ref 136–145)
TSH SERPL DL<=0.05 MIU/L-ACNC: 0.36 UIU/ML (ref 0.27–4.2)
WBC NRBC COR # BLD AUTO: 18.37 10*3/MM3 (ref 3.4–10.8)

## 2023-12-15 PROCEDURE — 82607 VITAMIN B-12: CPT | Performed by: NURSE PRACTITIONER

## 2023-12-15 PROCEDURE — 85025 COMPLETE CBC W/AUTO DIFF WBC: CPT | Performed by: NURSE PRACTITIONER

## 2023-12-15 PROCEDURE — 82306 VITAMIN D 25 HYDROXY: CPT | Performed by: NURSE PRACTITIONER

## 2023-12-15 PROCEDURE — 93005 ELECTROCARDIOGRAM TRACING: CPT

## 2023-12-15 PROCEDURE — 87635 SARS-COV-2 COVID-19 AMP PRB: CPT

## 2023-12-15 PROCEDURE — 87804 INFLUENZA ASSAY W/OPTIC: CPT

## 2023-12-15 PROCEDURE — 82746 ASSAY OF FOLIC ACID SERUM: CPT | Performed by: NURSE PRACTITIONER

## 2023-12-15 PROCEDURE — 1200000002 HC SEMI PRIVATE SWING BED

## 2023-12-15 PROCEDURE — 84443 ASSAY THYROID STIM HORMONE: CPT | Performed by: NURSE PRACTITIONER

## 2023-12-15 PROCEDURE — 80053 COMPREHEN METABOLIC PANEL: CPT | Performed by: NURSE PRACTITIONER

## 2023-12-15 RX ORDER — HYDROXYZINE PAMOATE 25 MG/1
25 CAPSULE ORAL EVERY 6 HOURS PRN
Status: DISCONTINUED | OUTPATIENT
Start: 2023-12-15 | End: 2023-12-17

## 2023-12-15 RX ORDER — LANOLIN ALCOHOL/MO/W.PET/CERES
3 CREAM (GRAM) TOPICAL NIGHTLY PRN
Status: DISCONTINUED | OUTPATIENT
Start: 2023-12-15 | End: 2023-12-24 | Stop reason: HOSPADM

## 2023-12-16 ENCOUNTER — APPOINTMENT (OUTPATIENT)
Dept: CT IMAGING | Facility: HOSPITAL | Age: 78
DRG: 947 | End: 2023-12-16
Attending: INTERNAL MEDICINE
Payer: MEDICARE

## 2023-12-16 PROBLEM — R53.81 DECLINING FUNCTIONAL STATUS: Status: ACTIVE | Noted: 2023-12-16

## 2023-12-16 PROBLEM — N30.01 ACUTE CYSTITIS WITH HEMATURIA: Status: ACTIVE | Noted: 2023-12-16

## 2023-12-16 PROBLEM — I10 PRIMARY HYPERTENSION: Status: ACTIVE | Noted: 2023-12-16

## 2023-12-16 PROBLEM — E04.1 THYROID NODULE: Status: ACTIVE | Noted: 2023-12-16

## 2023-12-16 PROBLEM — J90 PLEURAL EFFUSION: Status: ACTIVE | Noted: 2023-12-16

## 2023-12-16 LAB
25(OH)D3 SERPL-MCNC: 41.1 NG/ML (ref 30–100)
ALBUMIN SERPL-MCNC: 1.9 G/DL (ref 3.4–4.8)
ALBUMIN/GLOB SERPL: 0.5 {RATIO} (ref 0.8–2)
ALP SERPL-CCNC: 723 U/L (ref 25–100)
ALT SERPL-CCNC: 18 U/L (ref 4–36)
ANION GAP SERPL CALCULATED.3IONS-SCNC: 7 MMOL/L (ref 3–16)
AST SERPL-CCNC: 23 U/L (ref 8–33)
BACTERIA URNS QL MICRO: ABNORMAL /HPF
BASOPHILS # BLD: 0.1 K/UL (ref 0–0.1)
BASOPHILS NFR BLD: 0.7 %
BILIRUB SERPL-MCNC: 0.4 MG/DL (ref 0.3–1.2)
BILIRUB UR QL STRIP.AUTO: NEGATIVE
BUN SERPL-MCNC: 19 MG/DL (ref 6–20)
CALCIUM SERPL-MCNC: 7.7 MG/DL (ref 8.5–10.5)
CHLORIDE SERPL-SCNC: 107 MMOL/L (ref 98–107)
CLARITY UR: CLEAR
CO2 SERPL-SCNC: 24 MMOL/L (ref 20–30)
COLOR UR: YELLOW
CREAT SERPL-MCNC: 0.6 MG/DL (ref 0.4–1.2)
EOSINOPHIL # BLD: 1.1 K/UL (ref 0–0.4)
EOSINOPHIL NFR BLD: 5.7 %
EPI CELLS #/AREA URNS HPF: ABNORMAL /HPF (ref 0–5)
ERYTHROCYTE [DISTWIDTH] IN BLOOD BY AUTOMATED COUNT: 15.8 % (ref 11–16)
FOLATE SERPL-MCNC: 14.1 NG/ML (ref 4.78–24.2)
GFR SERPLBLD CREATININE-BSD FMLA CKD-EPI: >60 ML/MIN/{1.73_M2}
GLOBULIN SER CALC-MCNC: 4.1 G/DL
GLUCOSE SERPL-MCNC: 90 MG/DL (ref 74–106)
GLUCOSE UR STRIP.AUTO-MCNC: NEGATIVE MG/DL
HCT VFR BLD AUTO: 29.7 % (ref 37–47)
HGB BLD-MCNC: 9.5 G/DL (ref 11.5–16.5)
HGB UR QL STRIP.AUTO: ABNORMAL
IMM GRANULOCYTES # BLD: 0.5 K/UL
IMM GRANULOCYTES NFR BLD: 2.8 % (ref 0–5)
KETONES UR STRIP.AUTO-MCNC: NEGATIVE MG/DL
LEUKOCYTE ESTERASE UR QL STRIP.AUTO: ABNORMAL
LYMPHOCYTES # BLD: 1.9 K/UL (ref 1.5–4)
LYMPHOCYTES NFR BLD: 10.3 %
MCH RBC QN AUTO: 27.9 PG (ref 27–32)
MCHC RBC AUTO-ENTMCNC: 32 G/DL (ref 31–35)
MCV RBC AUTO: 87.1 FL (ref 80–100)
MONOCYTES # BLD: 0.7 K/UL (ref 0.2–0.8)
MONOCYTES NFR BLD: 3.9 %
NEUTROPHILS # BLD: 14.1 K/UL (ref 2–7.5)
NEUTS SEG NFR BLD: 76.6 %
NITRITE UR QL STRIP.AUTO: POSITIVE
PH UR STRIP.AUTO: 5.5 [PH] (ref 5–8)
PLATELET # BLD AUTO: 429 K/UL (ref 150–400)
PMV BLD AUTO: 9.4 FL (ref 6–10)
POTASSIUM SERPL-SCNC: 4.2 MMOL/L (ref 3.4–5.1)
PROT SERPL-MCNC: 6 G/DL (ref 6.4–8.3)
PROT UR STRIP.AUTO-MCNC: 30 MG/DL
RBC # BLD AUTO: 3.41 M/UL (ref 3.8–5.8)
RBC #/AREA URNS HPF: ABNORMAL /HPF (ref 0–4)
SODIUM SERPL-SCNC: 138 MMOL/L (ref 136–145)
SP GR UR STRIP.AUTO: 1.02 (ref 1–1.03)
UA COMPLETE W REFLEX CULTURE PNL UR: YES
UA DIPSTICK W REFLEX MICRO PNL UR: YES
URN SPEC COLLECT METH UR: ABNORMAL
UROBILINOGEN UR STRIP-ACNC: 0.2 E.U./DL
VIT B12 BLD-MCNC: 1263 PG/ML (ref 211–946)
WBC # BLD AUTO: 18.5 K/UL (ref 4–11)
WBC #/AREA URNS HPF: ABNORMAL /HPF (ref 0–5)

## 2023-12-16 PROCEDURE — 81001 URINALYSIS AUTO W/SCOPE: CPT

## 2023-12-16 PROCEDURE — 6360000002 HC RX W HCPCS: Performed by: NURSE PRACTITIONER

## 2023-12-16 PROCEDURE — 70450 CT HEAD/BRAIN W/O DYE: CPT

## 2023-12-16 PROCEDURE — 80053 COMPREHEN METABOLIC PANEL: CPT

## 2023-12-16 PROCEDURE — 1200000002 HC SEMI PRIVATE SWING BED

## 2023-12-16 PROCEDURE — 2580000003 HC RX 258: Performed by: NURSE PRACTITIONER

## 2023-12-16 PROCEDURE — 6370000000 HC RX 637 (ALT 250 FOR IP): Performed by: NURSE PRACTITIONER

## 2023-12-16 PROCEDURE — 70490 CT SOFT TISSUE NECK W/O DYE: CPT

## 2023-12-16 PROCEDURE — 85025 COMPLETE CBC W/AUTO DIFF WBC: CPT

## 2023-12-16 PROCEDURE — 36415 COLL VENOUS BLD VENIPUNCTURE: CPT

## 2023-12-16 RX ORDER — FAMOTIDINE 20 MG/1
20 TABLET, FILM COATED ORAL 2 TIMES DAILY
Status: DISCONTINUED | OUTPATIENT
Start: 2023-12-16 | End: 2023-12-24 | Stop reason: HOSPADM

## 2023-12-16 RX ORDER — LANOLIN/MINERAL OIL
LOTION (ML) TOPICAL 2 TIMES DAILY
Status: DISCONTINUED | OUTPATIENT
Start: 2023-12-16 | End: 2023-12-24 | Stop reason: HOSPADM

## 2023-12-16 RX ORDER — ENOXAPARIN SODIUM 100 MG/ML
40 INJECTION SUBCUTANEOUS DAILY
Status: DISCONTINUED | OUTPATIENT
Start: 2023-12-17 | End: 2023-12-24 | Stop reason: HOSPADM

## 2023-12-16 RX ORDER — ACETAMINOPHEN 650 MG/1
650 SUPPOSITORY RECTAL EVERY 6 HOURS PRN
Status: DISCONTINUED | OUTPATIENT
Start: 2023-12-16 | End: 2023-12-24 | Stop reason: HOSPADM

## 2023-12-16 RX ORDER — ONDANSETRON 4 MG/1
4 TABLET, ORALLY DISINTEGRATING ORAL EVERY 8 HOURS PRN
Status: DISCONTINUED | OUTPATIENT
Start: 2023-12-16 | End: 2023-12-24 | Stop reason: HOSPADM

## 2023-12-16 RX ORDER — ONDANSETRON 2 MG/ML
4 INJECTION INTRAMUSCULAR; INTRAVENOUS EVERY 6 HOURS PRN
Status: DISCONTINUED | OUTPATIENT
Start: 2023-12-16 | End: 2023-12-24 | Stop reason: HOSPADM

## 2023-12-16 RX ORDER — ACETAMINOPHEN 325 MG/1
650 TABLET ORAL EVERY 6 HOURS PRN
Status: DISCONTINUED | OUTPATIENT
Start: 2023-12-16 | End: 2023-12-24 | Stop reason: HOSPADM

## 2023-12-16 RX ORDER — LANOLIN ALCOHOL/MO/W.PET/CERES
3 CREAM (GRAM) TOPICAL NIGHTLY
Status: DISCONTINUED | OUTPATIENT
Start: 2023-12-16 | End: 2023-12-16

## 2023-12-16 RX ORDER — CETIRIZINE HYDROCHLORIDE 10 MG/1
10 TABLET ORAL DAILY
Status: DISCONTINUED | OUTPATIENT
Start: 2023-12-16 | End: 2023-12-24 | Stop reason: HOSPADM

## 2023-12-16 RX ORDER — CARVEDILOL 3.12 MG/1
3.12 TABLET ORAL 2 TIMES DAILY WITH MEALS
Status: DISCONTINUED | OUTPATIENT
Start: 2023-12-16 | End: 2023-12-18

## 2023-12-16 RX ORDER — HYDROXYZINE PAMOATE 25 MG/1
25 CAPSULE ORAL 3 TIMES DAILY PRN
Status: DISCONTINUED | OUTPATIENT
Start: 2023-12-16 | End: 2023-12-16

## 2023-12-16 RX ORDER — SODIUM CHLORIDE 0.9 % (FLUSH) 0.9 %
10 SYRINGE (ML) INJECTION PRN
Status: DISCONTINUED | OUTPATIENT
Start: 2023-12-16 | End: 2023-12-24 | Stop reason: HOSPADM

## 2023-12-16 RX ADMIN — Medication 473 ML: at 21:06

## 2023-12-16 RX ADMIN — CETIRIZINE HYDROCHLORIDE 10 MG: 10 TABLET, FILM COATED ORAL at 09:43

## 2023-12-16 RX ADMIN — FAMOTIDINE 20 MG: 20 TABLET ORAL at 20:46

## 2023-12-16 RX ADMIN — CEFTRIAXONE 1000 MG: 1 INJECTION, POWDER, FOR SOLUTION INTRAMUSCULAR; INTRAVENOUS at 20:46

## 2023-12-16 RX ADMIN — CARVEDILOL 3.12 MG: 3.12 TABLET, FILM COATED ORAL at 09:42

## 2023-12-16 RX ADMIN — FAMOTIDINE 20 MG: 20 TABLET ORAL at 09:43

## 2023-12-16 RX ADMIN — CARVEDILOL 3.12 MG: 3.12 TABLET, FILM COATED ORAL at 17:19

## 2023-12-16 RX ADMIN — Medication 473 ML: at 09:42

## 2023-12-16 NOTE — PLAN OF CARE
Problem: Discharge Planning  Goal: Discharge to home or other facility with appropriate resources  12/16/2023 0917 by Patricia Kiser RN  Outcome: Progressing  12/16/2023 0343 by Rachelle Julian RN  Outcome: Progressing     Problem: Safety - Adult  Goal: Free from fall injury  12/16/2023 0343 by Rachelle Julian RN  Outcome: Progressing     Problem: ABCDS Injury Assessment  Goal: Absence of physical injury  12/16/2023 0343 by Rachelle Julian RN  Outcome: Progressing     Problem: Skin/Tissue Integrity  Goal: Absence of new skin breakdown  Description: 1. Monitor for areas of redness and/or skin breakdown  2. Assess vascular access sites hourly  3. Every 4-6 hours minimum:  Change oxygen saturation probe site  4. Every 4-6 hours:  If on nasal continuous positive airway pressure, respiratory therapy assess nares and determine need for appliance change or resting period.   12/16/2023 0917 by Patricia Kiser RN  Outcome: Progressing  12/16/2023 0343 by Rachelle Julian RN  Outcome: Progressing

## 2023-12-16 NOTE — H&P
History and Physical    Patient:  Tolu pSivey    CHIEF COMPLAINT:    Declining functional status    HISTORY OF PRESENT ILLNESS:   The patient is a 66 y.o. female PMH HTN, thyroid nodule, arthritis who admitted to swing bed for worsening generalized weakness several weeks. Patient with resolving erythroderma rash suspect related to losartan potassium-HCTZ. Medication changed to carvedilol. Followed by dermatology and PCP. 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 Patient was hospitalized from 11/17-11/20 at [facility] [place]. 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 At home, patient slowly declining functional status. Worsening lower extremity weakness.  reports ambulates with assistive devices but past few days worsening lower extremity weakness. Rash has improved with multiple topicals, Atarax, Pepcid and Zyrtec. Patient admitted to swing bed for further rehabilitation and medical management. Upon arrival for swing bed, WBC 18.5, alh phos 700s. RN reported AMS this AM and baseline oriented. CT head Ventricular dilatations identified for check cannot exclude communicating hydrocephalus. No bleed or shift. No other CT head to compare to.  present and reports slight AMS upon waking up at times. Reports no significant changes in mental status. No headaches. Thyroid nodule right side palpated upon exam.  reports seems more swollen than normal. CT soft tissue neck shows large right pleural effusion increased in size, centrilobular emphysema, enlarged multinodular thyroid gland with nodules up to 1.1 cm stable from 11/7/23 CT chest, no lymphadenopathy.   Of note, 11/7/2023 CT chest showed enlargement of main

## 2023-12-17 LAB
ALBUMIN SERPL-MCNC: 1.8 G/DL (ref 3.4–4.8)
ALBUMIN/GLOB SERPL: 0.4 {RATIO} (ref 0.8–2)
ALP SERPL-CCNC: 721 U/L (ref 25–100)
ALT SERPL-CCNC: 16 U/L (ref 4–36)
ANION GAP SERPL CALCULATED.3IONS-SCNC: 5 MMOL/L (ref 3–16)
AST SERPL-CCNC: 19 U/L (ref 8–33)
BILIRUB SERPL-MCNC: 0.4 MG/DL (ref 0.3–1.2)
BUN SERPL-MCNC: 18 MG/DL (ref 6–20)
CALCIUM SERPL-MCNC: 7.7 MG/DL (ref 8.5–10.5)
CHLORIDE SERPL-SCNC: 104 MMOL/L (ref 98–107)
CO2 SERPL-SCNC: 26 MMOL/L (ref 20–30)
CREAT SERPL-MCNC: 0.6 MG/DL (ref 0.4–1.2)
ERYTHROCYTE [DISTWIDTH] IN BLOOD BY AUTOMATED COUNT: 15.9 % (ref 11–16)
GFR SERPLBLD CREATININE-BSD FMLA CKD-EPI: >60 ML/MIN/{1.73_M2}
GLOBULIN SER CALC-MCNC: 4.3 G/DL
GLUCOSE SERPL-MCNC: 93 MG/DL (ref 74–106)
HCT VFR BLD AUTO: 31.2 % (ref 37–47)
HGB BLD-MCNC: 9.7 G/DL (ref 11.5–16.5)
MCH RBC QN AUTO: 27.5 PG (ref 27–32)
MCHC RBC AUTO-ENTMCNC: 31.1 G/DL (ref 31–35)
MCV RBC AUTO: 88.4 FL (ref 80–100)
PLATELET # BLD AUTO: 393 K/UL (ref 150–400)
PMV BLD AUTO: 9.2 FL (ref 6–10)
POTASSIUM SERPL-SCNC: 4 MMOL/L (ref 3.4–5.1)
PROT SERPL-MCNC: 6.1 G/DL (ref 6.4–8.3)
PTH-INTACT SERPL-MCNC: 41.7 PG/ML (ref 14–72)
RBC # BLD AUTO: 3.53 M/UL (ref 3.8–5.8)
SODIUM SERPL-SCNC: 135 MMOL/L (ref 136–145)
T4 FREE SERPL-MCNC: 1.42 NG/DL (ref 0.89–1.76)
WBC # BLD AUTO: 17.3 K/UL (ref 4–11)

## 2023-12-17 PROCEDURE — 6360000002 HC RX W HCPCS: Performed by: NURSE PRACTITIONER

## 2023-12-17 PROCEDURE — 36415 COLL VENOUS BLD VENIPUNCTURE: CPT

## 2023-12-17 PROCEDURE — 87086 URINE CULTURE/COLONY COUNT: CPT

## 2023-12-17 PROCEDURE — 94761 N-INVAS EAR/PLS OXIMETRY MLT: CPT

## 2023-12-17 PROCEDURE — 2700000000 HC OXYGEN THERAPY PER DAY

## 2023-12-17 PROCEDURE — 1200000002 HC SEMI PRIVATE SWING BED

## 2023-12-17 PROCEDURE — 6370000000 HC RX 637 (ALT 250 FOR IP): Performed by: NURSE PRACTITIONER

## 2023-12-17 PROCEDURE — 87186 SC STD MICRODIL/AGAR DIL: CPT

## 2023-12-17 PROCEDURE — 84439 ASSAY OF FREE THYROXINE: CPT

## 2023-12-17 PROCEDURE — 2580000003 HC RX 258: Performed by: NURSE PRACTITIONER

## 2023-12-17 PROCEDURE — 80053 COMPREHEN METABOLIC PANEL: CPT

## 2023-12-17 PROCEDURE — 85027 COMPLETE CBC AUTOMATED: CPT

## 2023-12-17 PROCEDURE — P9047 ALBUMIN (HUMAN), 25%, 50ML: HCPCS | Performed by: NURSE PRACTITIONER

## 2023-12-17 PROCEDURE — 87088 URINE BACTERIA CULTURE: CPT

## 2023-12-17 PROCEDURE — 83970 ASSAY OF PARATHORMONE: CPT

## 2023-12-17 RX ORDER — HYDRALAZINE HYDROCHLORIDE 25 MG/1
25 TABLET, FILM COATED ORAL EVERY 6 HOURS PRN
Status: DISCONTINUED | OUTPATIENT
Start: 2023-12-17 | End: 2023-12-18

## 2023-12-17 RX ORDER — HYDRALAZINE HYDROCHLORIDE 25 MG/1
25 TABLET, FILM COATED ORAL EVERY 12 HOURS SCHEDULED
Status: DISCONTINUED | OUTPATIENT
Start: 2023-12-17 | End: 2023-12-17

## 2023-12-17 RX ORDER — ALBUMIN (HUMAN) 12.5 G/50ML
50 SOLUTION INTRAVENOUS ONCE
Status: DISCONTINUED | OUTPATIENT
Start: 2023-12-17 | End: 2023-12-17

## 2023-12-17 RX ORDER — HYDROXYZINE HYDROCHLORIDE 10 MG/1
25 TABLET, FILM COATED ORAL EVERY 6 HOURS PRN
Status: DISCONTINUED | OUTPATIENT
Start: 2023-12-17 | End: 2023-12-24 | Stop reason: HOSPADM

## 2023-12-17 RX ADMIN — Medication 3 MG: at 21:09

## 2023-12-17 RX ADMIN — ACETAMINOPHEN 650 MG: 325 TABLET, FILM COATED ORAL at 21:09

## 2023-12-17 RX ADMIN — CARVEDILOL 3.12 MG: 3.12 TABLET, FILM COATED ORAL at 17:09

## 2023-12-17 RX ADMIN — ACETAMINOPHEN 650 MG: 325 TABLET, FILM COATED ORAL at 12:32

## 2023-12-17 RX ADMIN — FAMOTIDINE 20 MG: 20 TABLET ORAL at 20:46

## 2023-12-17 RX ADMIN — CARVEDILOL 3.12 MG: 3.12 TABLET, FILM COATED ORAL at 08:13

## 2023-12-17 RX ADMIN — FAMOTIDINE 20 MG: 20 TABLET ORAL at 08:13

## 2023-12-17 RX ADMIN — Medication 473 ML: at 08:15

## 2023-12-17 RX ADMIN — HYDROXYZINE HYDROCHLORIDE 25 MG: 10 TABLET ORAL at 13:42

## 2023-12-17 RX ADMIN — ALBUMIN HUMAN 50 G: 0.25 SOLUTION INTRAVENOUS at 12:07

## 2023-12-17 RX ADMIN — CETIRIZINE HYDROCHLORIDE 10 MG: 10 TABLET, FILM COATED ORAL at 08:13

## 2023-12-17 RX ADMIN — HYDRALAZINE HYDROCHLORIDE 25 MG: 25 TABLET, FILM COATED ORAL at 08:13

## 2023-12-17 RX ADMIN — ENOXAPARIN SODIUM 40 MG: 100 INJECTION SUBCUTANEOUS at 08:13

## 2023-12-17 RX ADMIN — Medication 473 ML: at 20:54

## 2023-12-17 RX ADMIN — CEFTRIAXONE 1000 MG: 1 INJECTION, POWDER, FOR SOLUTION INTRAMUSCULAR; INTRAVENOUS at 20:52

## 2023-12-17 NOTE — PLAN OF CARE
Problem: Discharge Planning  Goal: Discharge to home or other facility with appropriate resources  Outcome: Progressing  Flowsheets (Taken 12/17/2023 0045 by Steve Sandoval RN)  Discharge to home or other facility with appropriate resources:   Identify barriers to discharge with patient and caregiver   Refer to discharge planning if patient needs post-hospital services based on physician order or complex needs related to functional status, cognitive ability or social support system     Problem: Safety - Adult  Goal: Free from fall injury  Outcome: Progressing

## 2023-12-18 ENCOUNTER — APPOINTMENT (OUTPATIENT)
Dept: GENERAL RADIOLOGY | Facility: HOSPITAL | Age: 78
DRG: 947 | End: 2023-12-18
Attending: INTERNAL MEDICINE
Payer: MEDICARE

## 2023-12-18 PROBLEM — L53.9 ERYTHRODERMA: Status: ACTIVE | Noted: 2023-12-18

## 2023-12-18 PROBLEM — L40.8 ERYTHRODERMIC PSORIASIS: Status: ACTIVE | Noted: 2023-12-15

## 2023-12-18 PROBLEM — L40.8 ERYTHRODERMIC PSORIASIS: Status: RESOLVED | Noted: 2023-12-15 | Resolved: 2023-12-18

## 2023-12-18 PROCEDURE — 2580000003 HC RX 258: Performed by: NURSE PRACTITIONER

## 2023-12-18 PROCEDURE — 2700000000 HC OXYGEN THERAPY PER DAY

## 2023-12-18 PROCEDURE — 71045 X-RAY EXAM CHEST 1 VIEW: CPT

## 2023-12-18 PROCEDURE — 94761 N-INVAS EAR/PLS OXIMETRY MLT: CPT

## 2023-12-18 PROCEDURE — 97535 SELF CARE MNGMENT TRAINING: CPT

## 2023-12-18 PROCEDURE — 97165 OT EVAL LOW COMPLEX 30 MIN: CPT

## 2023-12-18 PROCEDURE — 97530 THERAPEUTIC ACTIVITIES: CPT

## 2023-12-18 PROCEDURE — 1200000002 HC SEMI PRIVATE SWING BED

## 2023-12-18 PROCEDURE — 6360000002 HC RX W HCPCS: Performed by: NURSE PRACTITIONER

## 2023-12-18 PROCEDURE — 97802 MEDICAL NUTRITION INDIV IN: CPT

## 2023-12-18 PROCEDURE — 97162 PT EVAL MOD COMPLEX 30 MIN: CPT

## 2023-12-18 PROCEDURE — 6370000000 HC RX 637 (ALT 250 FOR IP): Performed by: NURSE PRACTITIONER

## 2023-12-18 RX ORDER — HYDRALAZINE HYDROCHLORIDE 25 MG/1
25 TABLET, FILM COATED ORAL EVERY 8 HOURS PRN
Status: DISCONTINUED | OUTPATIENT
Start: 2023-12-18 | End: 2023-12-24 | Stop reason: HOSPADM

## 2023-12-18 RX ORDER — HYDROXYZINE HYDROCHLORIDE 25 MG/1
25 TABLET, FILM COATED ORAL EVERY 6 HOURS PRN
COMMUNITY

## 2023-12-18 RX ORDER — CARVEDILOL 6.25 MG/1
6.25 TABLET ORAL 2 TIMES DAILY WITH MEALS
Status: DISCONTINUED | OUTPATIENT
Start: 2023-12-19 | End: 2023-12-20

## 2023-12-18 RX ADMIN — CETIRIZINE HYDROCHLORIDE 10 MG: 10 TABLET, FILM COATED ORAL at 10:04

## 2023-12-18 RX ADMIN — Medication 3 MG: at 20:57

## 2023-12-18 RX ADMIN — ENOXAPARIN SODIUM 40 MG: 100 INJECTION SUBCUTANEOUS at 10:04

## 2023-12-18 RX ADMIN — CEFTRIAXONE 1000 MG: 1 INJECTION, POWDER, FOR SOLUTION INTRAMUSCULAR; INTRAVENOUS at 21:12

## 2023-12-18 RX ADMIN — Medication 473 ML: at 10:05

## 2023-12-18 RX ADMIN — Medication: at 21:12

## 2023-12-18 RX ADMIN — FAMOTIDINE 20 MG: 20 TABLET ORAL at 10:04

## 2023-12-18 RX ADMIN — CARVEDILOL 3.12 MG: 3.12 TABLET, FILM COATED ORAL at 16:48

## 2023-12-18 RX ADMIN — HYDROXYZINE HYDROCHLORIDE 25 MG: 10 TABLET ORAL at 20:57

## 2023-12-18 RX ADMIN — FAMOTIDINE 20 MG: 20 TABLET ORAL at 20:57

## 2023-12-18 RX ADMIN — CARVEDILOL 3.12 MG: 3.12 TABLET, FILM COATED ORAL at 10:05

## 2023-12-18 NOTE — ACP (ADVANCE CARE PLANNING)
Advance Care Planning     General Advance Care Planning (ACP) Conversation    Date of Conversation: 12/18/2023  Conducted with: Patient with Decision Making Capacity    Healthcare Decision Maker:    Primary Decision Maker: Klever murillo - Spouse - [phone]    Secondary Decision Maker: Олгьа Braun - Child - [phone]  Click here to complete Healthcare Decision Makers including selection of the Healthcare Decision Maker Relationship (ie \"Primary\"). Today we documented Decision Maker(s) consistent with Legal Next of Kin hierarchy.     Content/Action Overview:  Has NO ACP documents/care preferences - information provided, considering goals and options  Reviewed DNR/DNI and patient elects Full Code (Attempt Resuscitation)  artificial nutrition, ventilation preferences, and resuscitation preferences      Length of Voluntary ACP Conversation in minutes:  <16 minutes (Non-Billable)    Sabrina Kaplan

## 2023-12-18 NOTE — CONSULTS
Comprehensive Nutrition Assessment    Type and Reason for Visit:  Initial, Consult, Wound    Nutrition Recommendations/Plan:   Continue current diet as medically appropriate and tolerated. Continue to encourage PO intakes as appropriate. Avg of 51-75% x 4 meals. Consider MVI. Ensure Plus ordered BID and Proteinex once daily. RD to follow pt and available PRN. Malnutrition Assessment:  Malnutrition Status:       Context:  Acute Illness     Findings of the 6 clinical characteristics of malnutrition:  Energy Intake:  Mild decrease in energy intake (Comment) (51-75% x 4)  Weight Loss:  Greater than 5% over 1 month     Body Fat Loss:        Muscle Mass Loss:       Fluid Accumulation:  Mild Extremities   Strength:  Not Performed    Nutrition Assessment:    Pt admitted with declining functional status. Pt is at moderate risk for ongoing nutritional compromise r/t wounds and fair-good PO intakes. Nutrition Related Findings:    Weight loss of 18 lb (10.7%) in ~ one month-first weight method not specified. PMH HTN, osteoporosis. Medications: pepcid. Labs: Na+ 135, Ca+ 7.7, alb 1.8, alk phos 721. Trace edema BLE. Intermittent AMS. Wound Type: Multiple, Skin Tears (Skin tears left and right forearm, wound left heel)       Current Nutrition Intake & Therapies:    Average Meal Intake: 51-75% (x 4)  Average Supplements Intake: None Ordered  ADULT DIET; Regular; Low Fat/Low Chol/High Fiber/JEAN  ADULT ORAL NUTRITION SUPPLEMENT; Lunch; Wound Healing Oral Supplement  ADULT ORAL NUTRITION SUPPLEMENT; Breakfast, Dinner; Standard High Calorie/High Protein Oral Supplement    Anthropometric Measures:  Height: 162.6 cm (5' 4\")  Ideal Body Weight (IBW): 120 lbs (55 kg)       Current Body Weight: 68.2 kg (150 lb 5.7 oz), 125.3 % IBW. Weight Source: Bed Scale  Current BMI (kg/m2): 25.8        Weight Adjustment For: No Adjustment                 BMI Categories: Overweight (BMI 25.0-29. 9)    Estimated Daily Nutrient

## 2023-12-18 NOTE — CARE COORDINATION
Case Management Assessment  Initial Evaluation    Date/Time of Evaluation: 12/18/2023 3:06 PM  Assessment Completed by: Susan Marsh RN    If patient is discharged prior to next notation, then this note serves as note for discharge by case management. Patient Name: Melanie Mcdonald                   YOB: 1945  Diagnosis: Declining functional status [R53.81]                   Date / Time: 12/15/2023  4:33 PM    Patient Admission Status: SKILLED IP   Readmission Risk (Low < 19, Mod (19-27), High > 27): Readmission Risk Score: 12.4    Current PCP: MELISSA Toscano  PCP verified by CM? Yes    Chart Reviewed: Yes      History Provided by: Medical Record  Patient Orientation: Person    Patient Cognition: Alert    Hospitalization in the last 30 days (Readmission):  Yes  - inpt stay  If yes, Readmission Assessment in  Navigator will be completed. Advance Directives:      Code Status: Full Code   Patient's Primary Decision Maker is: Legal Next of Kin    Primary Decision Maker: Klever murillo - Spouse - [phone]    Secondary Decision Maker: Leana Rueda - Child - [phone]    Discharge Planning:    Patient lives with: Spouse/Significant Other Type of Home: House  Primary Care Giver: Family  Patient Support Systems include: Spouse/Significant Other, Children, Family Members   Current Financial resources: Medicare  Current community resources:  [facility]  Current services prior to admission: Durable Medical Equipment, Home Care ([facility])            Current DME: Cane, Bedside Commode, Walker (rollator, RW)            Type of Home Care services:  OT, PT, Nursing Services    ADLS  Prior functional level: Assistance with the following:, Shopping, Mobility, Housework, Cooking  Current functional level: Assistance with the following:, Bathing, Dressing, Toileting, Cooking, Housework, Shopping, Mobility      Family can provide assistance at DC:  Yes  Would you like Case Management to

## 2023-12-19 ENCOUNTER — APPOINTMENT (OUTPATIENT)
Dept: ULTRASOUND IMAGING | Facility: HOSPITAL | Age: 78
DRG: 947 | End: 2023-12-19
Attending: INTERNAL MEDICINE
Payer: MEDICARE

## 2023-12-19 ENCOUNTER — OUTSIDE FACILITY SERVICE (OUTPATIENT)
Dept: CARDIOLOGY | Facility: CLINIC | Age: 78
End: 2023-12-19
Payer: MEDICARE

## 2023-12-19 LAB
ALBUMIN SERPL-MCNC: 1.9 G/DL (ref 3.4–4.8)
ALBUMIN/GLOB SERPL: 0.5 {RATIO} (ref 0.8–2)
ALP SERPL-CCNC: 737 U/L (ref 25–100)
ALT SERPL-CCNC: 15 U/L (ref 4–36)
ANION GAP SERPL CALCULATED.3IONS-SCNC: 7 MMOL/L (ref 3–16)
AST SERPL-CCNC: 26 U/L (ref 8–33)
BACTERIA UR CULT: ABNORMAL
BACTERIA UR CULT: ABNORMAL
BILIRUB SERPL-MCNC: 0.3 MG/DL (ref 0.3–1.2)
BUN SERPL-MCNC: 16 MG/DL (ref 6–20)
CALCIUM SERPL-MCNC: 7.7 MG/DL (ref 8.5–10.5)
CHLORIDE SERPL-SCNC: 103 MMOL/L (ref 98–107)
CO2 SERPL-SCNC: 27 MMOL/L (ref 20–30)
CREAT SERPL-MCNC: 0.6 MG/DL (ref 0.4–1.2)
EKG ATRIAL RATE: 99 BPM
EKG DIAGNOSIS: NORMAL
EKG P AXIS: 80 DEGREES
EKG P-R INTERVAL: 118 MS
EKG Q-T INTERVAL: 362 MS
EKG QRS DURATION: 86 MS
EKG QTC CALCULATION (BAZETT): 464 MS
EKG R AXIS: 99 DEGREES
EKG T AXIS: 107 DEGREES
EKG VENTRICULAR RATE: 99 BPM
ERYTHROCYTE [DISTWIDTH] IN BLOOD BY AUTOMATED COUNT: 15.6 % (ref 11–16)
FERRITIN SERPL IA-MCNC: 678.9 NG/ML (ref 22–322)
GFR SERPLBLD CREATININE-BSD FMLA CKD-EPI: >60 ML/MIN/{1.73_M2}
GLOBULIN SER CALC-MCNC: 3.7 G/DL
GLUCOSE SERPL-MCNC: 84 MG/DL (ref 74–106)
HCT VFR BLD AUTO: 28.5 % (ref 37–47)
HGB BLD-MCNC: 8.9 G/DL (ref 11.5–16.5)
IRON SATN MFR SERPL: 20 % (ref 15–50)
IRON SERPL-MCNC: 10 UG/DL (ref 37–145)
MAGNESIUM SERPL-MCNC: 1.9 MG/DL (ref 1.7–2.4)
MCH RBC QN AUTO: 27.6 PG (ref 27–32)
MCHC RBC AUTO-ENTMCNC: 31.2 G/DL (ref 31–35)
MCV RBC AUTO: 88.2 FL (ref 80–100)
ORGANISM: ABNORMAL
PLATELET # BLD AUTO: 381 K/UL (ref 150–400)
PMV BLD AUTO: 9.6 FL (ref 6–10)
POTASSIUM SERPL-SCNC: 4.2 MMOL/L (ref 3.4–5.1)
PREALB SERPL-MCNC: <3 MG/DL (ref 20–40)
PROT SERPL-MCNC: 5.6 G/DL (ref 6.4–8.3)
RBC # BLD AUTO: 3.23 M/UL (ref 3.8–5.8)
SODIUM SERPL-SCNC: 137 MMOL/L (ref 136–145)
TIBC SERPL-MCNC: 50 UG/DL (ref 250–450)
WBC # BLD AUTO: 14.4 K/UL (ref 4–11)

## 2023-12-19 PROCEDURE — 94761 N-INVAS EAR/PLS OXIMETRY MLT: CPT

## 2023-12-19 PROCEDURE — 2580000003 HC RX 258: Performed by: NURSE PRACTITIONER

## 2023-12-19 PROCEDURE — 36415 COLL VENOUS BLD VENIPUNCTURE: CPT

## 2023-12-19 PROCEDURE — 97530 THERAPEUTIC ACTIVITIES: CPT

## 2023-12-19 PROCEDURE — 84134 ASSAY OF PREALBUMIN: CPT

## 2023-12-19 PROCEDURE — 83550 IRON BINDING TEST: CPT

## 2023-12-19 PROCEDURE — 2700000000 HC OXYGEN THERAPY PER DAY

## 2023-12-19 PROCEDURE — 80053 COMPREHEN METABOLIC PANEL: CPT

## 2023-12-19 PROCEDURE — 83540 ASSAY OF IRON: CPT

## 2023-12-19 PROCEDURE — 6370000000 HC RX 637 (ALT 250 FOR IP): Performed by: NURSE PRACTITIONER

## 2023-12-19 PROCEDURE — 93970 EXTREMITY STUDY: CPT

## 2023-12-19 PROCEDURE — 6360000002 HC RX W HCPCS: Performed by: PHYSICIAN ASSISTANT

## 2023-12-19 PROCEDURE — 85027 COMPLETE CBC AUTOMATED: CPT

## 2023-12-19 PROCEDURE — 83735 ASSAY OF MAGNESIUM: CPT

## 2023-12-19 PROCEDURE — 93306 TTE W/DOPPLER COMPLETE: CPT

## 2023-12-19 PROCEDURE — 6360000002 HC RX W HCPCS: Performed by: NURSE PRACTITIONER

## 2023-12-19 PROCEDURE — 2580000003 HC RX 258: Performed by: PHYSICIAN ASSISTANT

## 2023-12-19 PROCEDURE — 82728 ASSAY OF FERRITIN: CPT

## 2023-12-19 PROCEDURE — 1200000002 HC SEMI PRIVATE SWING BED

## 2023-12-19 PROCEDURE — 97110 THERAPEUTIC EXERCISES: CPT

## 2023-12-19 PROCEDURE — 97535 SELF CARE MNGMENT TRAINING: CPT

## 2023-12-19 RX ORDER — MAGNESIUM SULFATE 1 G/100ML
1000 INJECTION INTRAVENOUS ONCE
Status: COMPLETED | OUTPATIENT
Start: 2023-12-19 | End: 2023-12-19

## 2023-12-19 RX ADMIN — Medication 3 MG: at 20:12

## 2023-12-19 RX ADMIN — ENOXAPARIN SODIUM 40 MG: 100 INJECTION SUBCUTANEOUS at 08:58

## 2023-12-19 RX ADMIN — IRON SUCROSE 200 MG: 20 INJECTION, SOLUTION INTRAVENOUS at 16:11

## 2023-12-19 RX ADMIN — CETIRIZINE HYDROCHLORIDE 10 MG: 10 TABLET, FILM COATED ORAL at 08:58

## 2023-12-19 RX ADMIN — FAMOTIDINE 20 MG: 20 TABLET ORAL at 20:12

## 2023-12-19 RX ADMIN — Medication 473 ML: at 09:02

## 2023-12-19 RX ADMIN — Medication 473 ML: at 20:19

## 2023-12-19 RX ADMIN — CEFTRIAXONE 1000 MG: 1 INJECTION, POWDER, FOR SOLUTION INTRAMUSCULAR; INTRAVENOUS at 20:16

## 2023-12-19 RX ADMIN — CARVEDILOL 6.25 MG: 6.25 TABLET, FILM COATED ORAL at 17:43

## 2023-12-19 RX ADMIN — FAMOTIDINE 20 MG: 20 TABLET ORAL at 08:58

## 2023-12-19 RX ADMIN — CARVEDILOL 6.25 MG: 6.25 TABLET, FILM COATED ORAL at 09:02

## 2023-12-19 RX ADMIN — MAGNESIUM SULFATE HEPTAHYDRATE 1000 MG: 1 INJECTION, SOLUTION INTRAVENOUS at 10:42

## 2023-12-19 RX ADMIN — HYDROXYZINE HYDROCHLORIDE 25 MG: 10 TABLET ORAL at 20:12

## 2023-12-19 NOTE — PLAN OF CARE
Problem: Discharge Planning  Goal: Discharge to home or other facility with appropriate resources  12/19/2023 0939 by Leatha Valle RN  Outcome: Progressing  Flowsheets (Taken 12/19/2023 5744)  Discharge to home or other facility with appropriate resources:   Identify barriers to discharge with patient and caregiver   Identify discharge learning needs (meds, wound care, etc)  12/18/2023 2237 by River Watts RN  Outcome: Progressing     Problem: Safety - Adult  Goal: Free from fall injury  12/19/2023 0939 by Leatha Valle RN  Outcome: Progressing  12/18/2023 2237 by River Watts RN  Outcome: Progressing     Problem: ABCDS Injury Assessment  Goal: Absence of physical injury  12/19/2023 0939 by Leatha Valle RN  Outcome: Progressing  12/18/2023 2237 by River Watts RN  Outcome: Progressing     Problem: Skin/Tissue Integrity  Goal: Absence of new skin breakdown  Description: 1. Monitor for areas of redness and/or skin breakdown  2. Assess vascular access sites hourly  3. Every 4-6 hours minimum:  Change oxygen saturation probe site  4. Every 4-6 hours:  If on nasal continuous positive airway pressure, respiratory therapy assess nares and determine need for appliance change or resting period.   12/19/2023 0939 by Leatha Valle RN  Outcome: Progressing  12/18/2023 2237 by River Watts RN  Outcome: Progressing

## 2023-12-19 NOTE — PLAN OF CARE
Problem: Discharge Planning  Goal: Discharge to home or other facility with appropriate resources  12/18/2023 2237 by Venkatesh Turcios RN  Outcome: Progressing  12/18/2023 1828 by Angella Dorado RN  Outcome: Progressing     Problem: Safety - Adult  Goal: Free from fall injury  12/18/2023 2237 by Venkatesh Turcios RN  Outcome: Progressing  12/18/2023 1828 by Angella Dorado RN  Outcome: Progressing     Problem: ABCDS Injury Assessment  Goal: Absence of physical injury  12/18/2023 2237 by Venkatesh Turcios RN  Outcome: Progressing  12/18/2023 1828 by Angella Dorado RN  Outcome: Progressing     Problem: Skin/Tissue Integrity  Goal: Absence of new skin breakdown  Description: 1. Monitor for areas of redness and/or skin breakdown  2. Assess vascular access sites hourly  3. Every 4-6 hours minimum:  Change oxygen saturation probe site  4. Every 4-6 hours:  If on nasal continuous positive airway pressure, respiratory therapy assess nares and determine need for appliance change or resting period.   12/18/2023 2237 by Venkatesh Turcios RN  Outcome: Progressing  12/18/2023 1828 by Angella Dorado RN  Outcome: Progressing

## 2023-12-20 ENCOUNTER — APPOINTMENT (OUTPATIENT)
Dept: CT IMAGING | Facility: HOSPITAL | Age: 78
DRG: 947 | End: 2023-12-20
Attending: INTERNAL MEDICINE
Payer: MEDICARE

## 2023-12-20 LAB
ALBUMIN SERPL-MCNC: 1.6 G/DL (ref 3.4–4.8)
ALBUMIN/GLOB SERPL: 0.4 {RATIO} (ref 0.8–2)
ALP SERPL-CCNC: 731 U/L (ref 25–100)
ALT SERPL-CCNC: 14 U/L (ref 4–36)
ANION GAP SERPL CALCULATED.3IONS-SCNC: 4 MMOL/L (ref 3–16)
AST SERPL-CCNC: 26 U/L (ref 8–33)
BASOPHILS # BLD: 0.1 K/UL (ref 0–0.1)
BASOPHILS NFR BLD: 0.5 %
BILIRUB SERPL-MCNC: 0.3 MG/DL (ref 0.3–1.2)
BUN SERPL-MCNC: 16 MG/DL (ref 6–20)
CALCIUM SERPL-MCNC: 7.6 MG/DL (ref 8.5–10.5)
CHLORIDE SERPL-SCNC: 101 MMOL/L (ref 98–107)
CO2 SERPL-SCNC: 28 MMOL/L (ref 20–30)
CREAT SERPL-MCNC: 0.6 MG/DL (ref 0.4–1.2)
EOSINOPHIL # BLD: 0.4 K/UL (ref 0–0.4)
EOSINOPHIL NFR BLD: 3.1 %
ERYTHROCYTE [DISTWIDTH] IN BLOOD BY AUTOMATED COUNT: 15.8 % (ref 11–16)
GFR SERPLBLD CREATININE-BSD FMLA CKD-EPI: >60 ML/MIN/{1.73_M2}
GLOBULIN SER CALC-MCNC: 3.9 G/DL
GLUCOSE SERPL-MCNC: 87 MG/DL (ref 74–106)
HCT VFR BLD AUTO: 26.9 % (ref 37–47)
HGB BLD-MCNC: 8.5 G/DL (ref 11.5–16.5)
IMM GRANULOCYTES # BLD: 0.4 K/UL
IMM GRANULOCYTES NFR BLD: 2.6 % (ref 0–5)
LYMPHOCYTES # BLD: 2.1 K/UL (ref 1.5–4)
LYMPHOCYTES NFR BLD: 16 %
MAGNESIUM SERPL-MCNC: 2.1 MG/DL (ref 1.7–2.4)
MCH RBC QN AUTO: 27.6 PG (ref 27–32)
MCHC RBC AUTO-ENTMCNC: 31.6 G/DL (ref 31–35)
MCV RBC AUTO: 87.3 FL (ref 80–100)
MONOCYTES # BLD: 0.9 K/UL (ref 0.2–0.8)
MONOCYTES NFR BLD: 6.7 %
NEUTROPHILS # BLD: 9.5 K/UL (ref 2–7.5)
NEUTS SEG NFR BLD: 71.1 %
PHOSPHATE SERPL-MCNC: 3.1 MG/DL (ref 2.5–4.5)
PLATELET # BLD AUTO: 388 K/UL (ref 150–400)
PMV BLD AUTO: 9.5 FL (ref 6–10)
POTASSIUM SERPL-SCNC: 4.1 MMOL/L (ref 3.4–5.1)
PROT SERPL-MCNC: 5.5 G/DL (ref 6.4–8.3)
RBC # BLD AUTO: 3.08 M/UL (ref 3.8–5.8)
SODIUM SERPL-SCNC: 133 MMOL/L (ref 136–145)
WBC # BLD AUTO: 13.4 K/UL (ref 4–11)

## 2023-12-20 PROCEDURE — 97530 THERAPEUTIC ACTIVITIES: CPT

## 2023-12-20 PROCEDURE — 6360000002 HC RX W HCPCS: Performed by: NURSE PRACTITIONER

## 2023-12-20 PROCEDURE — 84100 ASSAY OF PHOSPHORUS: CPT

## 2023-12-20 PROCEDURE — 74176 CT ABD & PELVIS W/O CONTRAST: CPT

## 2023-12-20 PROCEDURE — 2580000003 HC RX 258: Performed by: NURSE PRACTITIONER

## 2023-12-20 PROCEDURE — 80053 COMPREHEN METABOLIC PANEL: CPT

## 2023-12-20 PROCEDURE — 1200000002 HC SEMI PRIVATE SWING BED

## 2023-12-20 PROCEDURE — 6370000000 HC RX 637 (ALT 250 FOR IP): Performed by: NURSE PRACTITIONER

## 2023-12-20 PROCEDURE — 97110 THERAPEUTIC EXERCISES: CPT

## 2023-12-20 PROCEDURE — 94761 N-INVAS EAR/PLS OXIMETRY MLT: CPT

## 2023-12-20 PROCEDURE — 36415 COLL VENOUS BLD VENIPUNCTURE: CPT

## 2023-12-20 PROCEDURE — 99306 1ST NF CARE HIGH MDM 50: CPT | Performed by: NURSE PRACTITIONER

## 2023-12-20 PROCEDURE — 2580000003 HC RX 258: Performed by: PHYSICIAN ASSISTANT

## 2023-12-20 PROCEDURE — 97535 SELF CARE MNGMENT TRAINING: CPT

## 2023-12-20 PROCEDURE — 2700000000 HC OXYGEN THERAPY PER DAY

## 2023-12-20 PROCEDURE — 85025 COMPLETE CBC W/AUTO DIFF WBC: CPT

## 2023-12-20 PROCEDURE — 71250 CT THORAX DX C-: CPT

## 2023-12-20 PROCEDURE — 6360000002 HC RX W HCPCS: Performed by: PHYSICIAN ASSISTANT

## 2023-12-20 PROCEDURE — 83735 ASSAY OF MAGNESIUM: CPT

## 2023-12-20 RX ORDER — ERYTHROMYCIN 5 MG/G
OINTMENT OPHTHALMIC NIGHTLY
Status: DISCONTINUED | OUTPATIENT
Start: 2023-12-20 | End: 2023-12-24 | Stop reason: HOSPADM

## 2023-12-20 RX ADMIN — HYDROXYZINE HYDROCHLORIDE 25 MG: 10 TABLET ORAL at 21:54

## 2023-12-20 RX ADMIN — FAMOTIDINE 20 MG: 20 TABLET ORAL at 09:22

## 2023-12-20 RX ADMIN — FAMOTIDINE 20 MG: 20 TABLET ORAL at 21:54

## 2023-12-20 RX ADMIN — Medication 3 MG: at 21:54

## 2023-12-20 RX ADMIN — ENOXAPARIN SODIUM 40 MG: 100 INJECTION SUBCUTANEOUS at 09:23

## 2023-12-20 RX ADMIN — CEFTRIAXONE 1000 MG: 1 INJECTION, POWDER, FOR SOLUTION INTRAMUSCULAR; INTRAVENOUS at 22:03

## 2023-12-20 RX ADMIN — IRON SUCROSE 200 MG: 20 INJECTION, SOLUTION INTRAVENOUS at 14:46

## 2023-12-20 RX ADMIN — Medication 473 ML: at 21:55

## 2023-12-20 RX ADMIN — CETIRIZINE HYDROCHLORIDE 10 MG: 10 TABLET, FILM COATED ORAL at 09:22

## 2023-12-20 RX ADMIN — Medication 473 ML: at 09:23

## 2023-12-20 NOTE — CARE COORDINATION
Swing Bed Interdisciplinary Care Plan Conference Report    Patients name:Rima Lagunas  Date of Conference: 12/20/2023    Interdisciplinary rounding completed. Activities reviewed with OT. The Following Information was discussed and agreed upon with the patient and/or Caregivers as listed below. Names of Team Members and Caregivers present for meeting:  : Tolu Dillard RN  Nursing: Philip Avila RN  Therapy: Aruna, PT; SHIVAM Valdovinos  Dietician: Jaden Gusman      Nutrition:  Current Body Weight:   Wt Readings from Last 1 Encounters:   12/15/23 68.2 kg (150 lb 4.8 oz)     Weight Change: In: 26 [P.O.:236]  Out: 400 [Urine:400]  Current Diet order:ADULT DIET; Regular; Low Fat/Low Chol/High Fiber/JEAN  ADULT ORAL NUTRITION SUPPLEMENT; Breakfast, Dinner, Lunch; Standard High Calorie/High Protein Oral Supplement  ADULT ORAL NUTRITION SUPPLEMENT; Lunch; Wound Healing Oral Supplement  ADULT ORAL NUTRITION SUPPLEMENT; Dinner; Frozen Oral Supplement  Notes:  150 lb. Regular, low fat/low chol/high fiber/JEAN. Ensure TID, Magic Cup and Gelatein once daily. , Medications: venofer, pepcid. + 1 pitting generalized edema, + 1 pitting edema BLE. Avg PO intake 37-57% x 7 meals. Physical/Occupational Therapy:  Current ADL Status: ADL  Feeding: Modified independent   Grooming: Contact guard assistance  UE Bathing: Minimal assistance  LE Bathing:  Moderate assistance  UE Dressing: Minimal assistance  LE Dressing: Maximum assistance, Dependent/Total  Toileting: Unable to assess(comment)  Skin Care: Lotion  Transfers:   Transfers  Sit to Stand: Minimal Assistance, 2 Person Assistance  Stand to Sit: Minimal Assistance, 2 Person Assistance  Bed to Chair: Minimal assistance, Moderate assistance, 2 Person Assistance  Stand Pivot Transfers: Minimal Assistance, Moderate Assistance, 2 Person Assistance  Mobility/Ambulation:   Ambulation  Surface: Level tile  Device: Rolling Walker  Other Apparatus: O2  Comments: took 1-2 shuffling steps;

## 2023-12-20 NOTE — PLAN OF CARE
Problem: Discharge Planning  Goal: Discharge to home or other facility with appropriate resources  12/20/2023 1017 by Son Baker RN  Outcome: Progressing  Flowsheets (Taken 12/20/2023 0945)  Discharge to home or other facility with appropriate resources:   Identify barriers to discharge with patient and caregiver   Identify discharge learning needs (meds, wound care, etc)  12/20/2023 0402 by Mary Cheek RN  Outcome: Progressing     Problem: Safety - Adult  Goal: Free from fall injury  12/20/2023 1017 by Son Baker RN  Outcome: Progressing  12/20/2023 0402 by Mary Cheek RN  Outcome: Progressing     Problem: ABCDS Injury Assessment  Goal: Absence of physical injury  12/20/2023 1017 by Son Baker RN  Outcome: Progressing  12/20/2023 0402 by Mary Cheek RN  Outcome: Progressing     Problem: Skin/Tissue Integrity  Goal: Absence of new skin breakdown  Description: 1. Monitor for areas of redness and/or skin breakdown  2. Assess vascular access sites hourly  3. Every 4-6 hours minimum:  Change oxygen saturation probe site  4. Every 4-6 hours:  If on nasal continuous positive airway pressure, respiratory therapy assess nares and determine need for appliance change or resting period.   12/20/2023 1017 by Son Baker RN  Outcome: Progressing  12/20/2023 0402 by Mary Cheek RN  Outcome: Progressing

## 2023-12-21 PROBLEM — N94.9 ADNEXAL CYST: Status: ACTIVE | Noted: 2023-12-21

## 2023-12-21 PROBLEM — R00.0 TACHYCARDIA: Status: ACTIVE | Noted: 2023-12-21

## 2023-12-21 PROBLEM — K76.0 STEATOSIS OF LIVER: Status: ACTIVE | Noted: 2023-12-21

## 2023-12-21 PROBLEM — E88.09 HYPOALBUMINEMIA: Status: ACTIVE | Noted: 2023-12-21

## 2023-12-21 LAB
AMMONIA PLAS-SCNC: 31 MCG/DL (ref 19–87)
INR PPP: 1.15 (ref 0.87–1.11)
PROTHROMBIN TIME: 14.6 SEC (ref 11.8–14.2)

## 2023-12-21 PROCEDURE — 92610 EVALUATE SWALLOWING FUNCTION: CPT

## 2023-12-21 PROCEDURE — 6360000002 HC RX W HCPCS: Performed by: PHYSICIAN ASSISTANT

## 2023-12-21 PROCEDURE — 97530 THERAPEUTIC ACTIVITIES: CPT

## 2023-12-21 PROCEDURE — 6360000002 HC RX W HCPCS: Performed by: NURSE PRACTITIONER

## 2023-12-21 PROCEDURE — 2700000000 HC OXYGEN THERAPY PER DAY

## 2023-12-21 PROCEDURE — 82140 ASSAY OF AMMONIA: CPT

## 2023-12-21 PROCEDURE — 85610 PROTHROMBIN TIME: CPT

## 2023-12-21 PROCEDURE — 97803 MED NUTRITION INDIV SUBSEQ: CPT

## 2023-12-21 PROCEDURE — 6370000000 HC RX 637 (ALT 250 FOR IP): Performed by: PHYSICIAN ASSISTANT

## 2023-12-21 PROCEDURE — 99310 SBSQ NF CARE HIGH MDM 45: CPT | Performed by: PHYSICIAN ASSISTANT

## 2023-12-21 PROCEDURE — 97535 SELF CARE MNGMENT TRAINING: CPT

## 2023-12-21 PROCEDURE — 36415 COLL VENOUS BLD VENIPUNCTURE: CPT

## 2023-12-21 PROCEDURE — 1200000002 HC SEMI PRIVATE SWING BED

## 2023-12-21 PROCEDURE — 2580000003 HC RX 258: Performed by: PHYSICIAN ASSISTANT

## 2023-12-21 PROCEDURE — P9047 ALBUMIN (HUMAN), 25%, 50ML: HCPCS | Performed by: PHYSICIAN ASSISTANT

## 2023-12-21 PROCEDURE — 6370000000 HC RX 637 (ALT 250 FOR IP): Performed by: NURSE PRACTITIONER

## 2023-12-21 RX ADMIN — ERYTHROMYCIN: 5 OINTMENT OPHTHALMIC at 21:15

## 2023-12-21 RX ADMIN — CETIRIZINE HYDROCHLORIDE 10 MG: 10 TABLET, FILM COATED ORAL at 09:48

## 2023-12-21 RX ADMIN — FAMOTIDINE 20 MG: 20 TABLET ORAL at 09:48

## 2023-12-21 RX ADMIN — Medication 473 ML: at 21:16

## 2023-12-21 RX ADMIN — Medication 3 MG: at 21:13

## 2023-12-21 RX ADMIN — ENOXAPARIN SODIUM 40 MG: 100 INJECTION SUBCUTANEOUS at 09:48

## 2023-12-21 RX ADMIN — HYDROXYZINE HYDROCHLORIDE 25 MG: 10 TABLET ORAL at 21:13

## 2023-12-21 RX ADMIN — IRON SUCROSE 200 MG: 20 INJECTION, SOLUTION INTRAVENOUS at 17:55

## 2023-12-21 RX ADMIN — FAMOTIDINE 20 MG: 20 TABLET ORAL at 21:13

## 2023-12-21 RX ADMIN — ALBUMIN HUMAN 50 G: 0.25 SOLUTION INTRAVENOUS at 14:37

## 2023-12-21 RX ADMIN — Medication 473 ML: at 09:42

## 2023-12-21 NOTE — PLAN OF CARE
Problem: Discharge Planning  Goal: Discharge to home or other facility with appropriate resources  12/20/2023 2300 by Vivien García RN  Outcome: Progressing  12/20/2023 1017 by Chester Felty, RN  Outcome: Progressing  Flowsheets (Taken 12/20/2023 0913)  Discharge to home or other facility with appropriate resources:   Identify barriers to discharge with patient and caregiver   Identify discharge learning needs (meds, wound care, etc)     Problem: Safety - Adult  Goal: Free from fall injury  12/20/2023 2300 by Vivien García RN  Outcome: Progressing  12/20/2023 1017 by Chester Felty, RN  Outcome: Progressing     Problem: ABCDS Injury Assessment  Goal: Absence of physical injury  12/20/2023 2300 by Vivien García RN  Outcome: Progressing  12/20/2023 1017 by Chester Felty, RN  Outcome: Progressing     Problem: Skin/Tissue Integrity  Goal: Absence of new skin breakdown  Description: 1. Monitor for areas of redness and/or skin breakdown  2. Assess vascular access sites hourly  3. Every 4-6 hours minimum:  Change oxygen saturation probe site  4. Every 4-6 hours:  If on nasal continuous positive airway pressure, respiratory therapy assess nares and determine need for appliance change or resting period.   12/20/2023 2300 by Vivien García RN  Outcome: Progressing  12/20/2023 1017 by Chester Felty, RN  Outcome: Progressing

## 2023-12-21 NOTE — CONSULTS
Comprehensive Nutrition Assessment    Type and Reason for Visit:  Wound, Consult (Follow-up)    Nutrition Recommendations/Plan:   Diet liberated to promote increased PO intakes. Continue to encourage PO intakes as appropriate. Avg of 34-56% x 9 meals, meeting approx. 40-66% of estimated caloric needs. Ensure Plus increased to TID; pt states that she likes Ensure and was also drinking them at home prior to admission. Gelatein and [employer] also ordered once daily to promote increased intakes and help meet estimated nutrient needs. RD to follow pt and available PRN. Malnutrition Assessment:  Malnutrition Status: At risk for malnutrition (Comment) (r/t weight loss) (12/18/23 1102)    Context:  Acute Illness     Findings of the 6 clinical characteristics of malnutrition:  Energy Intake:  Mild decrease in energy intake (Comment) (51-75% x 4)  Weight Loss:  Greater than 5% over 1 month     Body Fat Loss:        Muscle Mass Loss:       Fluid Accumulation:  Mild Extremities   Strength:  Not Performed    Nutrition Assessment:    Pt continues to be at moderate risk for ongoing nutritional compromsie r/t fair intakes and wounds. Nutrition Related Findings:    No new weights since 12/15. Medications: venofer, pepcid. Labs: Na+ 133, Ca+ 7.6, alb 1.6, alk phos 731, prealbumin < 3.0. + 1 pitting generalized edema, + 1 pitting edema BLE. Wound Type: Multiple, Skin Tears, Pressure Injury, Stage II (Skin tears left and right forearm, wound left heel, PI buttocks)       Current Nutrition Intake & Therapies:    Average Meal Intake: 26-50%, 51-75% (34-56% x 9)  Average Supplements Intake: 26-50%, 51-75% (Pt states that she likes Ensure and drinks them at home as well. She is willing to try Magic Cup and [employer].)  ADULT ORAL NUTRITION SUPPLEMENT; Breakfast, Dinner, Lunch; Standard High Calorie/High Protein Oral Supplement  ADULT ORAL NUTRITION SUPPLEMENT; Lunch;  Wound Healing Oral Supplement  ADULT ORAL NUTRITION

## 2023-12-21 NOTE — PLAN OF CARE
Problem: Discharge Planning  Goal: Discharge to home or other facility with appropriate resources  12/21/2023 1031 by Jose Phillips RN  Outcome: Progressing  Flowsheets (Taken 12/21/2023 1910)  Discharge to home or other facility with appropriate resources:   Identify barriers to discharge with patient and caregiver   Identify discharge learning needs (meds, wound care, etc)  12/20/2023 2300 by Theresa Cleveland RN  Outcome: Progressing     Problem: Safety - Adult  Goal: Free from fall injury  12/21/2023 1031 by Jose Phillips RN  Outcome: Progressing  12/20/2023 2300 by Theresa Cleveland RN  Outcome: Progressing     Problem: ABCDS Injury Assessment  Goal: Absence of physical injury  12/21/2023 1031 by Jose Phillips RN  Outcome: Progressing  12/20/2023 2300 by Theresa Cleveland RN  Outcome: Progressing     Problem: Skin/Tissue Integrity  Goal: Absence of new skin breakdown  Description: 1. Monitor for areas of redness and/or skin breakdown  2. Assess vascular access sites hourly  3. Every 4-6 hours minimum:  Change oxygen saturation probe site  4. Every 4-6 hours:  If on nasal continuous positive airway pressure, respiratory therapy assess nares and determine need for appliance change or resting period.   12/21/2023 1031 by Jose Phillips RN  Outcome: Progressing  12/20/2023 2300 by Theresa Cleveland RN  Outcome: Progressing     Problem: Nutrition Deficit:  Goal: Optimize nutritional status  Outcome: Progressing

## 2023-12-22 PROCEDURE — 97530 THERAPEUTIC ACTIVITIES: CPT

## 2023-12-22 PROCEDURE — 6360000002 HC RX W HCPCS: Performed by: NURSE PRACTITIONER

## 2023-12-22 PROCEDURE — 97535 SELF CARE MNGMENT TRAINING: CPT

## 2023-12-22 PROCEDURE — 6370000000 HC RX 637 (ALT 250 FOR IP): Performed by: NURSE PRACTITIONER

## 2023-12-22 PROCEDURE — 97116 GAIT TRAINING THERAPY: CPT

## 2023-12-22 PROCEDURE — 2700000000 HC OXYGEN THERAPY PER DAY

## 2023-12-22 PROCEDURE — 1200000002 HC SEMI PRIVATE SWING BED

## 2023-12-22 RX ORDER — FERROUS SULFATE 324(65)MG
324 TABLET, DELAYED RELEASE (ENTERIC COATED) ORAL 2 TIMES DAILY WITH MEALS
Status: DISCONTINUED | OUTPATIENT
Start: 2023-12-23 | End: 2023-12-24 | Stop reason: HOSPADM

## 2023-12-22 RX ORDER — IPRATROPIUM BROMIDE AND ALBUTEROL SULFATE 2.5; .5 MG/3ML; MG/3ML
1 SOLUTION RESPIRATORY (INHALATION) EVERY 6 HOURS PRN
Status: DISCONTINUED | OUTPATIENT
Start: 2023-12-22 | End: 2023-12-23

## 2023-12-22 RX ADMIN — ERYTHROMYCIN: 5 OINTMENT OPHTHALMIC at 20:41

## 2023-12-22 RX ADMIN — FAMOTIDINE 20 MG: 20 TABLET ORAL at 08:46

## 2023-12-22 RX ADMIN — Medication 473 ML: at 08:46

## 2023-12-22 RX ADMIN — CETIRIZINE HYDROCHLORIDE 10 MG: 10 TABLET, FILM COATED ORAL at 08:46

## 2023-12-22 RX ADMIN — ENOXAPARIN SODIUM 40 MG: 100 INJECTION SUBCUTANEOUS at 08:45

## 2023-12-22 RX ADMIN — Medication 473 ML: at 20:43

## 2023-12-22 RX ADMIN — FAMOTIDINE 20 MG: 20 TABLET ORAL at 20:41

## 2023-12-22 NOTE — PLAN OF CARE
Problem: Discharge Planning  Goal: Discharge to home or other facility with appropriate resources  12/22/2023 0110 by Michelle Michele RN  Outcome: Progressing  12/22/2023 0015 by Michelle Michele RN  Outcome: Progressing     Problem: Safety - Adult  Goal: Free from fall injury  12/22/2023 0110 by Michelle Michele RN  Outcome: Progressing  12/22/2023 0015 by Michelle Michele RN  Outcome: Progressing     Problem: ABCDS Injury Assessment  Goal: Absence of physical injury  Outcome: Progressing     Problem: Skin/Tissue Integrity  Goal: Absence of new skin breakdown  Description: 1. Monitor for areas of redness and/or skin breakdown  2. Assess vascular access sites hourly  3. Every 4-6 hours minimum:  Change oxygen saturation probe site  4. Every 4-6 hours:  If on nasal continuous positive airway pressure, respiratory therapy assess nares and determine need for appliance change or resting period.   12/22/2023 0116 by Michelle Michele RN  Outcome: Progressing  12/22/2023 0110 by Michelle Michele RN  Outcome: Progressing  12/22/2023 0015 by Michelle Michele RN  Outcome: Progressing     Problem: Nutrition Deficit:  Goal: Optimize nutritional status  12/22/2023 0110 by Michelle Michele RN  Outcome: Progressing  12/22/2023 0015 by Michelle Michele RN  Outcome: Progressing

## 2023-12-22 NOTE — PLAN OF CARE
Problem: Discharge Planning  Goal: Discharge to home or other facility with appropriate resources  12/22/2023 0015 by Riri Narayanan RN  Outcome: Progressing  12/21/2023 1031 by Chester Felty, RN  Outcome: Progressing  Flowsheets (Taken 12/21/2023 2801)  Discharge to home or other facility with appropriate resources:   Identify barriers to discharge with patient and caregiver   Identify discharge learning needs (meds, wound care, etc)     Problem: Safety - Adult  Goal: Free from fall injury  12/22/2023 0015 by Riri Narayanan RN  Outcome: Progressing  12/21/2023 1031 by Chester Felty, RN  Outcome: Progressing     Problem: ABCDS Injury Assessment  Goal: Absence of physical injury  12/21/2023 1031 by Chester Felty, RN  Outcome: Progressing     Problem: Skin/Tissue Integrity  Goal: Absence of new skin breakdown  Description: 1. Monitor for areas of redness and/or skin breakdown  2. Assess vascular access sites hourly  3. Every 4-6 hours minimum:  Change oxygen saturation probe site  4. Every 4-6 hours:  If on nasal continuous positive airway pressure, respiratory therapy assess nares and determine need for appliance change or resting period.   12/22/2023 0015 by Riri Narayanan RN  Outcome: Progressing  12/21/2023 1031 by Chester Felty, RN  Outcome: Progressing     Problem: Nutrition Deficit:  Goal: Optimize nutritional status  12/22/2023 0015 by Riri Narayanan RN  Outcome: Progressing  12/21/2023 1031 by Chester Felty, RN  Outcome: Progressing

## 2023-12-22 NOTE — PLAN OF CARE
Problem: Discharge Planning  Goal: Discharge to home or other facility with appropriate resources  12/22/2023 0110 by Joyce Hall RN  Outcome: Progressing  12/22/2023 0015 by Joyce Hall RN  Outcome: Progressing     Problem: Safety - Adult  Goal: Free from fall injury  12/22/2023 0110 by Joyce Hall RN  Outcome: Progressing  12/22/2023 0015 by Joyce Hall RN  Outcome: Progressing     Problem: Skin/Tissue Integrity  Goal: Absence of new skin breakdown  Description: 1. Monitor for areas of redness and/or skin breakdown  2. Assess vascular access sites hourly  3. Every 4-6 hours minimum:  Change oxygen saturation probe site  4. Every 4-6 hours:  If on nasal continuous positive airway pressure, respiratory therapy assess nares and determine need for appliance change or resting period.   12/22/2023 0110 by Joyce Hall RN  Outcome: Progressing  12/22/2023 0015 by oJyce Hall RN  Outcome: Progressing     Problem: Nutrition Deficit:  Goal: Optimize nutritional status  12/22/2023 0110 by Joyce Hall RN  Outcome: Progressing  12/22/2023 0015 by Joyce Hall RN  Outcome: Progressing

## 2023-12-22 NOTE — PLAN OF CARE
Problem: Discharge Planning  Goal: Discharge to home or other facility with appropriate resources  12/22/2023 0947 by Jorge Miller RN  Outcome: Progressing  Flowsheets (Taken 12/22/2023 0800)  Discharge to home or other facility with appropriate resources: Identify barriers to discharge with patient and caregiver  12/22/2023 0110 by Ava Carrera RN  Outcome: Progressing  12/22/2023 0015 by Ava Carrera RN  Outcome: Progressing     Problem: Safety - Adult  Goal: Free from fall injury  12/22/2023 0947 by Jorge Miller RN  Outcome: Progressing  12/22/2023 0110 by Ava Carrera RN  Outcome: Progressing  12/22/2023 0015 by Ava Carrera RN  Outcome: Progressing     Problem: ABCDS Injury Assessment  Goal: Absence of physical injury  12/22/2023 0947 by Jorge Miller RN  Outcome: Progressing  12/22/2023 0116 by Ava Carrera RN  Outcome: Progressing     Problem: Skin/Tissue Integrity  Goal: Absence of new skin breakdown  Description: 1. Monitor for areas of redness and/or skin breakdown  2. Assess vascular access sites hourly  3. Every 4-6 hours minimum:  Change oxygen saturation probe site  4. Every 4-6 hours:  If on nasal continuous positive airway pressure, respiratory therapy assess nares and determine need for appliance change or resting period.   12/22/2023 0947 by Jorge Miller RN  Outcome: Progressing  12/22/2023 0116 by Ava Carrera RN  Outcome: Progressing  12/22/2023 0110 by Ava Carrera RN  Outcome: Progressing  12/22/2023 0015 by Ava Carrera RN  Outcome: Progressing     Problem: Nutrition Deficit:  Goal: Optimize nutritional status  12/22/2023 0947 by Jorge Miller RN  Outcome: Progressing  12/22/2023 0110 by Ava Carrera RN  Outcome: Progressing  12/22/2023 0015 by Ava Carrera RN  Outcome: Progressing

## 2023-12-23 ENCOUNTER — APPOINTMENT (OUTPATIENT)
Dept: GENERAL RADIOLOGY | Facility: HOSPITAL | Age: 78
DRG: 947 | End: 2023-12-23
Attending: INTERNAL MEDICINE
Payer: MEDICARE

## 2023-12-23 PROBLEM — J96.01 ACUTE RESPIRATORY FAILURE WITH HYPOXIA (HCC): Status: ACTIVE | Noted: 2023-12-23

## 2023-12-23 PROBLEM — U07.1 COVID-19: Status: ACTIVE | Noted: 2023-12-23

## 2023-12-23 LAB
ALBUMIN SERPL-MCNC: 2.6 G/DL (ref 3.4–4.8)
ALBUMIN/GLOB SERPL: 0.7 {RATIO} (ref 0.8–2)
ALP SERPL-CCNC: 1322 U/L (ref 25–100)
ALT SERPL-CCNC: 45 U/L (ref 4–36)
ANION GAP SERPL CALCULATED.3IONS-SCNC: 7 MMOL/L (ref 3–16)
AST SERPL-CCNC: 95 U/L (ref 8–33)
BASOPHILS # BLD: 0.1 K/UL (ref 0–0.1)
BASOPHILS NFR BLD: 0.9 %
BILIRUB SERPL-MCNC: 0.6 MG/DL (ref 0.3–1.2)
BUN SERPL-MCNC: 19 MG/DL (ref 6–20)
CALCIUM SERPL-MCNC: 8.4 MG/DL (ref 8.5–10.5)
CHLORIDE SERPL-SCNC: 100 MMOL/L (ref 98–107)
CO2 SERPL-SCNC: 30 MMOL/L (ref 20–30)
CREAT SERPL-MCNC: <0.5 MG/DL (ref 0.4–1.2)
CRP SERPL-MCNC: 145.5 MG/L (ref 0–5.1)
EOSINOPHIL # BLD: 0.9 K/UL (ref 0–0.4)
EOSINOPHIL NFR BLD: 6 %
ERYTHROCYTE [DISTWIDTH] IN BLOOD BY AUTOMATED COUNT: 16 % (ref 11–16)
GFR SERPLBLD CREATININE-BSD FMLA CKD-EPI: >60 ML/MIN/{1.73_M2}
GLOBULIN SER CALC-MCNC: 4 G/DL
GLUCOSE SERPL-MCNC: 84 MG/DL (ref 74–106)
HAV IGM SERPL QL IA: NORMAL
HBV CORE IGM SERPL QL IA: NORMAL
HBV SURFACE AG SERPL QL IA: NORMAL
HCT VFR BLD AUTO: 29.1 % (ref 37–47)
HCV AB SERPL QL IA: NORMAL
HGB BLD-MCNC: 8.8 G/DL (ref 11.5–16.5)
IMM GRANULOCYTES # BLD: 1.4 K/UL
IMM GRANULOCYTES NFR BLD: 8.7 % (ref 0–5)
LYMPHOCYTES # BLD: 3.1 K/UL (ref 1.5–4)
LYMPHOCYTES NFR BLD: 19.4 %
MAGNESIUM SERPL-MCNC: 2 MG/DL (ref 1.7–2.4)
MCH RBC QN AUTO: 26.7 PG (ref 27–32)
MCHC RBC AUTO-ENTMCNC: 30.2 G/DL (ref 31–35)
MCV RBC AUTO: 88.2 FL (ref 80–100)
MONOCYTES # BLD: 0.8 K/UL (ref 0.2–0.8)
MONOCYTES NFR BLD: 5.1 %
NEUTROPHILS # BLD: 9.5 K/UL (ref 2–7.5)
NEUTS SEG NFR BLD: 59.9 %
PLATELET # BLD AUTO: 504 K/UL (ref 150–400)
PMV BLD AUTO: 9.5 FL (ref 6–10)
POTASSIUM SERPL-SCNC: 4.2 MMOL/L (ref 3.4–5.1)
PROT SERPL-MCNC: 6.6 G/DL (ref 6.4–8.3)
RBC # BLD AUTO: 3.3 M/UL (ref 3.8–5.8)
SARS-COV-2 RDRP RESP QL NAA+PROBE: DETECTED
SODIUM SERPL-SCNC: 137 MMOL/L (ref 136–145)
WBC # BLD AUTO: 15.8 K/UL (ref 4–11)

## 2023-12-23 PROCEDURE — 2500000003 HC RX 250 WO HCPCS: Performed by: PHYSICIAN ASSISTANT

## 2023-12-23 PROCEDURE — 2580000003 HC RX 258: Performed by: PHYSICIAN ASSISTANT

## 2023-12-23 PROCEDURE — 87390 HIV-1 AG IA: CPT

## 2023-12-23 PROCEDURE — 6370000000 HC RX 637 (ALT 250 FOR IP): Performed by: PHYSICIAN ASSISTANT

## 2023-12-23 PROCEDURE — 87635 SARS-COV-2 COVID-19 AMP PRB: CPT

## 2023-12-23 PROCEDURE — 80053 COMPREHEN METABOLIC PANEL: CPT

## 2023-12-23 PROCEDURE — 6360000002 HC RX W HCPCS: Performed by: INTERNAL MEDICINE

## 2023-12-23 PROCEDURE — 86701 HIV-1ANTIBODY: CPT

## 2023-12-23 PROCEDURE — 6360000002 HC RX W HCPCS: Performed by: PHYSICIAN ASSISTANT

## 2023-12-23 PROCEDURE — 94761 N-INVAS EAR/PLS OXIMETRY MLT: CPT

## 2023-12-23 PROCEDURE — 36415 COLL VENOUS BLD VENIPUNCTURE: CPT

## 2023-12-23 PROCEDURE — 6370000000 HC RX 637 (ALT 250 FOR IP): Performed by: NURSE PRACTITIONER

## 2023-12-23 PROCEDURE — 2700000000 HC OXYGEN THERAPY PER DAY

## 2023-12-23 PROCEDURE — 1200000002 HC SEMI PRIVATE SWING BED

## 2023-12-23 PROCEDURE — 86140 C-REACTIVE PROTEIN: CPT

## 2023-12-23 PROCEDURE — 6360000002 HC RX W HCPCS: Performed by: NURSE PRACTITIONER

## 2023-12-23 PROCEDURE — 86480 TB TEST CELL IMMUN MEASURE: CPT

## 2023-12-23 PROCEDURE — 71045 X-RAY EXAM CHEST 1 VIEW: CPT

## 2023-12-23 PROCEDURE — 86702 HIV-2 ANTIBODY: CPT

## 2023-12-23 PROCEDURE — 80074 ACUTE HEPATITIS PANEL: CPT

## 2023-12-23 PROCEDURE — 83735 ASSAY OF MAGNESIUM: CPT

## 2023-12-23 PROCEDURE — 85025 COMPLETE CBC W/AUTO DIFF WBC: CPT

## 2023-12-23 RX ORDER — DEXAMETHASONE SODIUM PHOSPHATE 10 MG/ML
6 INJECTION INTRAMUSCULAR; INTRAVENOUS DAILY
Status: DISCONTINUED | OUTPATIENT
Start: 2023-12-23 | End: 2023-12-24 | Stop reason: HOSPADM

## 2023-12-23 RX ORDER — ASCORBIC ACID 500 MG
500 TABLET ORAL DAILY
Status: DISCONTINUED | OUTPATIENT
Start: 2023-12-23 | End: 2023-12-24 | Stop reason: HOSPADM

## 2023-12-23 RX ORDER — BUMETANIDE 0.25 MG/ML
1 INJECTION INTRAMUSCULAR; INTRAVENOUS ONCE
Status: COMPLETED | OUTPATIENT
Start: 2023-12-23 | End: 2023-12-23

## 2023-12-23 RX ORDER — ZINC SULFATE 50(220)MG
50 CAPSULE ORAL DAILY
Status: DISCONTINUED | OUTPATIENT
Start: 2023-12-23 | End: 2023-12-24 | Stop reason: HOSPADM

## 2023-12-23 RX ORDER — ALBUTEROL SULFATE 90 UG/1
2 AEROSOL, METERED RESPIRATORY (INHALATION) EVERY 6 HOURS PRN
Status: DISCONTINUED | OUTPATIENT
Start: 2023-12-23 | End: 2023-12-24 | Stop reason: HOSPADM

## 2023-12-23 RX ADMIN — PIPERACILLIN AND TAZOBACTAM 3375 MG: 3; .375 INJECTION, POWDER, LYOPHILIZED, FOR SOLUTION INTRAVENOUS at 17:58

## 2023-12-23 RX ADMIN — CETIRIZINE HYDROCHLORIDE 10 MG: 10 TABLET, FILM COATED ORAL at 10:15

## 2023-12-23 RX ADMIN — FERROUS SULFATE TAB EC 324 MG (65 MG FE EQUIVALENT) 324 MG: 324 (65 FE) TABLET DELAYED RESPONSE at 17:55

## 2023-12-23 RX ADMIN — Medication 473 ML: at 21:42

## 2023-12-23 RX ADMIN — OXYCODONE HYDROCHLORIDE AND ACETAMINOPHEN 500 MG: 500 TABLET ORAL at 14:31

## 2023-12-23 RX ADMIN — HYDROXYZINE HYDROCHLORIDE 25 MG: 10 TABLET ORAL at 21:37

## 2023-12-23 RX ADMIN — ERYTHROMYCIN: 5 OINTMENT OPHTHALMIC at 22:00

## 2023-12-23 RX ADMIN — Medication 473 ML: at 10:14

## 2023-12-23 RX ADMIN — FERROUS SULFATE TAB EC 324 MG (65 MG FE EQUIVALENT) 324 MG: 324 (65 FE) TABLET DELAYED RESPONSE at 10:23

## 2023-12-23 RX ADMIN — ZINC SULFATE 220 MG (50 MG) CAPSULE 50 MG: CAPSULE at 14:31

## 2023-12-23 RX ADMIN — FAMOTIDINE 20 MG: 20 TABLET ORAL at 10:15

## 2023-12-23 RX ADMIN — PIPERACILLIN AND TAZOBACTAM 4500 MG: 4; .5 INJECTION, POWDER, FOR SOLUTION INTRAVENOUS at 10:29

## 2023-12-23 RX ADMIN — DEXAMETHASONE SODIUM PHOSPHATE 6 MG: 10 INJECTION INTRAMUSCULAR; INTRAVENOUS at 21:37

## 2023-12-23 RX ADMIN — FAMOTIDINE 20 MG: 20 TABLET ORAL at 21:37

## 2023-12-23 RX ADMIN — BUMETANIDE 1 MG: 0.25 INJECTION INTRAMUSCULAR; INTRAVENOUS at 11:10

## 2023-12-23 RX ADMIN — Medication 3 MG: at 21:37

## 2023-12-23 RX ADMIN — ENOXAPARIN SODIUM 40 MG: 100 INJECTION SUBCUTANEOUS at 10:15

## 2023-12-23 RX ADMIN — BARICITINIB 4 MG: 2 TABLET, FILM COATED ORAL at 21:37

## 2023-12-23 NOTE — DISCHARGE SUMMARY
Patient was discharged/transferred to another hospital for a higher level of care. Patient was transferred when bed became available at the other hospital.  This might be the reason no discharge order was placed. (No definite day/time was given). Patient cleared for discharge/transfer. Note done by ERIN Clark. I have seen and examined the patient with the midlevel. I agree with the assessment and plan. Discharge process greater than 30 minutes. Discharge Summary      Patient ID: Jean Cabrera      Patient's PCP: MELISSA Campos    Admit Date: 12/15/2023     Discharge Date: 12/24/2023     Admitting Provider: Ascencion Moralez MD    Discharging Provider: Ayla Cedillo MD     Reason for this admission:   Declining functional status     Discharge Diagnoses: Active Hospital Problems    Diagnosis Date Noted    Acute respiratory failure with hypoxia ([address]) [J96.01] 12/23/2023    COVID-19 [U07.1] 12/23/2023    Steatosis of liver [K76.0] 12/21/2023    Hypoalbuminemia [E88.09] 12/21/2023    Adnexal cyst [N94.9] 12/21/2023    Tachycardia [R00.0] 12/21/2023    Declining functional status [R53.81] 12/16/2023    Thyroid nodule [E04.1] 12/16/2023    Acute cystitis with hematuria [N30.01] 12/16/2023    Primary hypertension [I10] 12/16/2023    Pleural effusion [J90] 12/16/2023    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 Mild pulmonary edema. Grossly stable cardiomediastinal silhouette. Electronically signed by Viviana Vinson MD      CT ABDOMEN PELVIS WO CONTRAST Additional Contrast? Radiologist Recommendation   Final Result      CHEST:      1. Large right pleural effusion markedly worsened in comparison to previous CT 11/7/2023.   2. Complete atelectasis of the right lower lobe and mostly atelectatic lobe.  Superimposed pneumonia is

## 2023-12-24 VITALS
DIASTOLIC BLOOD PRESSURE: 72 MMHG | SYSTOLIC BLOOD PRESSURE: 139 MMHG | RESPIRATION RATE: 20 BRPM | HEIGHT: 64 IN | HEART RATE: 105 BPM | BODY MASS INDEX: 25.66 KG/M2 | WEIGHT: 150.3 LBS | OXYGEN SATURATION: 94 % | TEMPERATURE: 97.5 F

## 2023-12-24 LAB
ALBUMIN SERPL-MCNC: 2.3 G/DL (ref 3.4–4.8)
ALBUMIN/GLOB SERPL: 0.6 {RATIO} (ref 0.8–2)
ALP SERPL-CCNC: 1153 U/L (ref 25–100)
ALT SERPL-CCNC: 51 U/L (ref 4–36)
ANION GAP SERPL CALCULATED.3IONS-SCNC: 7 MMOL/L (ref 3–16)
AST SERPL-CCNC: 84 U/L (ref 8–33)
BASOPHILS # BLD: 0.1 K/UL (ref 0–0.1)
BASOPHILS NFR BLD: 0.6 %
BILIRUB SERPL-MCNC: 0.5 MG/DL (ref 0.3–1.2)
BUN SERPL-MCNC: 18 MG/DL (ref 6–20)
CALCIUM SERPL-MCNC: 8.1 MG/DL (ref 8.5–10.5)
CHLORIDE SERPL-SCNC: 101 MMOL/L (ref 98–107)
CO2 SERPL-SCNC: 30 MMOL/L (ref 20–30)
CREAT SERPL-MCNC: 0.6 MG/DL (ref 0.4–1.2)
CRP SERPL-MCNC: 121.6 MG/L (ref 0–5.1)
EOSINOPHIL # BLD: 0 K/UL (ref 0–0.4)
EOSINOPHIL NFR BLD: 0.3 %
ERYTHROCYTE [DISTWIDTH] IN BLOOD BY AUTOMATED COUNT: 16.2 % (ref 11–16)
GFR SERPLBLD CREATININE-BSD FMLA CKD-EPI: >60 ML/MIN/{1.73_M2}
GLOBULIN SER CALC-MCNC: 3.9 G/DL
GLUCOSE SERPL-MCNC: 113 MG/DL (ref 74–106)
HCT VFR BLD AUTO: 27.5 % (ref 37–47)
HGB BLD-MCNC: 8.3 G/DL (ref 11.5–16.5)
HIV 1+2 AB+HIV1 P24 AG SERPL QL IA: NORMAL
HIV 2 AB SERPL QL IA: NORMAL
HIV1 AB SERPL QL IA: NORMAL
HIV1 P24 AG SERPL QL IA: NORMAL
IMM GRANULOCYTES # BLD: 1.1 K/UL
IMM GRANULOCYTES NFR BLD: 9.5 % (ref 0–5)
LYMPHOCYTES # BLD: 2.1 K/UL (ref 1.5–4)
LYMPHOCYTES NFR BLD: 18.1 %
MCH RBC QN AUTO: 26.4 PG (ref 27–32)
MCHC RBC AUTO-ENTMCNC: 30.2 G/DL (ref 31–35)
MCV RBC AUTO: 87.6 FL (ref 80–100)
MONOCYTES # BLD: 0.1 K/UL (ref 0.2–0.8)
MONOCYTES NFR BLD: 0.7 %
NEUTROPHILS # BLD: 8.3 K/UL (ref 2–7.5)
NEUTS SEG NFR BLD: 70.8 %
PLATELET # BLD AUTO: 488 K/UL (ref 150–400)
PMV BLD AUTO: 9.5 FL (ref 6–10)
POTASSIUM SERPL-SCNC: 4.4 MMOL/L (ref 3.4–5.1)
PROT SERPL-MCNC: 6.2 G/DL (ref 6.4–8.3)
RBC # BLD AUTO: 3.14 M/UL (ref 3.8–5.8)
SODIUM SERPL-SCNC: 138 MMOL/L (ref 136–145)
WBC # BLD AUTO: 11.7 K/UL (ref 4–11)

## 2023-12-24 PROCEDURE — 2580000003 HC RX 258: Performed by: PHYSICIAN ASSISTANT

## 2023-12-24 PROCEDURE — 80053 COMPREHEN METABOLIC PANEL: CPT

## 2023-12-24 PROCEDURE — 86140 C-REACTIVE PROTEIN: CPT

## 2023-12-24 PROCEDURE — 99316 NF DSCHRG MGMT 30 MIN+: CPT | Performed by: INTERNAL MEDICINE

## 2023-12-24 PROCEDURE — 85025 COMPLETE CBC W/AUTO DIFF WBC: CPT

## 2023-12-24 PROCEDURE — 94761 N-INVAS EAR/PLS OXIMETRY MLT: CPT

## 2023-12-24 PROCEDURE — 6360000002 HC RX W HCPCS: Performed by: PHYSICIAN ASSISTANT

## 2023-12-24 PROCEDURE — 36415 COLL VENOUS BLD VENIPUNCTURE: CPT

## 2023-12-24 RX ADMIN — PIPERACILLIN AND TAZOBACTAM 3375 MG: 3; .375 INJECTION, POWDER, LYOPHILIZED, FOR SOLUTION INTRAVENOUS at 03:17

## 2023-12-24 NOTE — FLOWSHEET NOTE
12/15/23 2000   Assessment   Charting Type Shift assessment   Psychosocial   Psychosocial (WDL) WDL   Neurological   Neuro (WDL) WDL   Level of Consciousness 0   Orientation Level Oriented to person;Oriented to place;Oriented to situation   Cognition Appropriate judgement; Appropriate safety awareness; Appropriate attention/concentration; Follows commands   [place] Coma Scale   Eye Opening 4   Best Verbal Response 5   Best Motor Response 6   [place] Coma Scale Score 15   HEENT (Head, Ears, Eyes, Nose, & Throat)   HEENT (WDL) X   Right Eye Impaired vision   Left Eye Impaired vision   Right Ear Impaired hearing   Left Ear Impaired hearing   Mucous Membrane Dry   Respiratory   Respiratory (WDL) X   Respiratory Pattern Regular   Respiratory Depth Normal   Breath Sounds   Right Upper Lobe Clear   Right Middle Lobe Clear   Right Lower Lobe Diminished   Left Upper Lobe Clear   Left Lower Lobe Diminished   Cardiac   Cardiac (WDL) WDL   Gastrointestinal   Abdominal (WDL) WDL   Genitourinary   Genitourinary (WDL) X   Peripheral Vascular   Peripheral Vascular (WDL) WDL   Edema Right lower extremity; Left lower extremity   RLE Edema Trace   LLE Edema Trace   Skin Integumentary    Skin Integumentary (WDL) X  (sacrum red, abrasion L facial cheek)   Skin Color Flushed;Red   Skin Condition/Temp Warm;Dry;Flaky; Peeling   Skin Integrity Ecchymosis; Excoriation   Location scattered   Skin Integrity Site 2   Skin Integrity Location 2 Tear   Location 2 l forearm   Skin Integrity Site 3   Skin Integrity Location 3 Tear    Location 3 r forearm   Skin Integrity Site 4   Skin Integrity Location 4 Redness;Rash   Location 4 scattered   Skin Integrity Site 5   Skin Integrity Location 5 Wound (see LDA)   Location 5 L heel   Preventative Dressing Yes   Dressing Site Heel   Wound 12/15/23 Arm Left; Lower; Posterior   Date First Assessed/Time First Assessed: 12/15/23 1630   Primary Wound Type: Skin Tear  Location: Arm  Wound Location Orientation:
12/16/23 0905   Assessment   Charting Type Shift assessment   Psychosocial   Psychosocial (WDL) WDL   Neurological   Neuro (WDL) WDL   Level of Consciousness 0   Orientation Level Oriented to person;Oriented to place;Oriented to situation   Cognition Appropriate judgement; Appropriate safety awareness; Appropriate attention/concentration; Follows commands   [place] Coma Scale   Eye Opening 4   Best Verbal Response 4   Best Motor Response 6   [place] Coma Scale Score 14   HEENT (Head, Ears, Eyes, Nose, & Throat)   HEENT (WDL) X   Right Eye Impaired vision   Left Eye Impaired vision   Right Ear Impaired hearing   Left Ear Impaired hearing   Mucous Membrane Dry   Respiratory   Respiratory (WDL) X   Respiratory Pattern Regular   Respiratory Depth Normal   Breath Sounds   Right Upper Lobe Clear   Right Middle Lobe Clear   Right Lower Lobe Diminished   Left Upper Lobe Clear   Left Lower Lobe Diminished   Cardiac   Cardiac (WDL) X   Cardiac Regularity Regular   Cardiac Rhythm Sinus tachy   Rhythm Interpretation   Pulse (!) 108   Cardiac Monitor   Cardiac/Telemetry Monitor On Portable telemetry pack applied   Telemetry Box Number    Telemetry Monitor Alarm Parameters    Gastrointestinal   Abdominal (WDL) WDL   Genitourinary   Genitourinary (WDL) X   Urine Assessment   Urinary Status Incontinent   Peripheral Vascular   Peripheral Vascular (WDL) WDL   Edema Right lower extremity; Left lower extremity   RLE Edema Trace   LLE Edema Trace   Skin Integumentary    Skin Integumentary (WDL) X  (sacrum red, abrasion L facial cheek)   Skin Color Flushed;Red   Skin Condition/Temp Dry;Flaky; Warm   Skin Integrity Ecchymosis; Rash   Location scattered   Skin Integrity Site 2   Skin Integrity Location 2 Tear   Location 2 L forearm   Preventative Dressing Yes   Skin Integrity Site 3   Skin Integrity Location 3 Tear    Location 3 R forearm   Preventative Dressing Yes   Skin Integrity Site 5   Skin Integrity Location 5 Wound (see
12/17/23 0045   Assessment   Charting Type Shift assessment   Psychosocial   Psychosocial (WDL) WDL   Neurological   Neuro (WDL) WDL   Level of Consciousness 0   Orientation Level Oriented to person;Disoriented to place; Disoriented to situation;Disoriented to time   Cognition Appropriate judgement; Appropriate safety awareness; Appropriate attention/concentration; Follows commands   Speech Clear   [place] Coma Scale   Eye Opening 4   Best Verbal Response 5   Best Motor Response 6   [place] Coma Scale Score 15   NIHSS Stroke Scale   NIHSS Stroke Scale Assessed No   HEENT (Head, Ears, Eyes, Nose, & Throat)   HEENT (WDL) X   Right Eye Impaired vision   Left Eye Impaired vision   Right Ear Impaired hearing   Left Ear Impaired hearing   Lips Dry   Mucous Membrane Dry   Neck Swollen   Respiratory   Respiratory (WDL) X   Respiratory Interventions H.O.B. elevated   Respiratory Pattern Regular   Respiratory Depth Normal   Breath Sounds   Right Upper Lobe Clear   Right Middle Lobe Clear   Right Lower Lobe Diminished   Left Upper Lobe Clear   Left Lower Lobe Diminished   Cardiac   Cardiac (WDL) X   Cardiac Regularity Regular   Heart Sounds S1, S2   Cardiac Rhythm Sinus rhythm   Rhythm Interpretation   Pulse 97   Cardiac Monitor   Telemetry Box Number    Alarm Audible Yes   Alarms Set Yes   Telemetry Monitor Alarm Parameters    Gastrointestinal   Abdominal (WDL) WDL   Genitourinary   Genitourinary (WDL) X   Urine Assessment   Urinary Status Incontinent; Incontinent briefs   Urinary Incontinence Present   Peripheral Vascular   Peripheral Vascular (WDL) WDL   Edema Right lower extremity; Left lower extremity   RLE Edema Trace   LLE Edema Trace   Skin Integumentary    Skin Integumentary (WDL) X   Skin Color Red;Flushed   Skin Condition/Temp Warm;Rash (comment); Other (comment);Dry;Fragile  (raised red rash scattered over body and legs and arms)   Skin Integrity Ecchymosis; Rash   Location scattered   Skin Integrity Site 2
12/17/23 1111   Assessment   Charting Type Shift assessment   Psychosocial   Psychosocial (WDL) WDL   Neurological   Neuro (WDL) WDL   Level of Consciousness 0   Orientation Level Oriented to person;Disoriented to place; Disoriented to situation;Disoriented to time   Cognition Appropriate judgement; Appropriate safety awareness; Appropriate attention/concentration; Follows commands   Speech Clear   [place] Coma Scale   Eye Opening 4   Best Verbal Response 5   Best Motor Response 6   [place] Coma Scale Score 15   HEENT (Head, Ears, Eyes, Nose, & Throat)   HEENT (WDL) X   Right Eye Impaired vision   Left Eye Impaired vision   Right Ear Impaired hearing   Left Ear Impaired hearing   Mucous Membrane Dry   Neck Swollen   Respiratory   Respiratory (WDL) X   Respiratory Pattern Regular   Respiratory Depth Normal   Breath Sounds   Right Upper Lobe Clear   Right Middle Lobe Clear   Right Lower Lobe Diminished   Left Upper Lobe Clear   Left Lower Lobe Diminished   Cardiac   Cardiac (WDL) X   Cardiac Regularity Regular   Heart Sounds S1, S2   Cardiac Rhythm Sinus rhythm   Cardiac Monitor   Telemetry Box Number    Alarm Audible Yes   Telemetry Monitor Alarm Parameters    Gastrointestinal   Abdominal (WDL) WDL   Genitourinary   Genitourinary (WDL) X   Urine Assessment   Urinary Status Incontinent   Peripheral Vascular   Peripheral Vascular (WDL) WDL   Edema Right lower extremity; Left lower extremity   RLE Edema Trace   LLE Edema Trace   Skin Integumentary    Skin Integumentary (WDL) X   Skin Color Red   Skin Condition/Temp Dry; Warm   Skin Integrity Ecchymosis; Rash   Location scattered   Skin Integrity Site 2   Skin Integrity Location 2 Tear   Location 2 L forearm   Skin Integrity Site 3   Skin Integrity Location 3 Tear    Location 3 R forearm   Skin Integrity Site 5   Skin Integrity Location 5 Wound (see LDA)   Location 5 L heel   Preventative Dressing Yes   Dressing Site Heel   Wound 12/15/23 Arm Left; Lower; Posterior
12/17/23 2145   Assessment   Charting Type Shift assessment   Psychosocial   Psychosocial (WDL) WDL   Neurological   Neuro (WDL) WDL   Level of Consciousness 0   Orientation Level Oriented to person;Disoriented to place; Disoriented to situation;Disoriented to time   Cognition Appropriate judgement; Appropriate safety awareness; Appropriate attention/concentration; Follows commands   Speech Clear   [place] Coma Scale   Eye Opening 4   Best Verbal Response 5   Best Motor Response 6   [place] Coma Scale Score 15   NIHSS Stroke Scale   NIHSS Stroke Scale Assessed No   HEENT (Head, Ears, Eyes, Nose, & Throat)   HEENT (WDL) X   Right Eye Impaired vision   Left Eye Impaired vision   Right Ear Impaired hearing   Left Ear Impaired hearing   Lips Dry   Mucous Membrane Dry   Neck Swollen   Respiratory   Respiratory (WDL) X   Respiratory Interventions H.O.B. elevated   Respiratory Pattern Regular   Respiratory Depth Normal   Breath Sounds   Right Upper Lobe Clear   Right Middle Lobe Clear   Right Lower Lobe Diminished   Left Upper Lobe Clear   Left Lower Lobe Diminished   Cardiac   Cardiac (WDL) X   Cardiac Regularity Regular   Heart Sounds S1, S2   Cardiac Rhythm Sinus rhythm   Rhythm Interpretation   Pulse 89   Cardiac Monitor   Telemetry Box Number    Alarm Audible Yes   Electrodes Replaced Yes   Telemetry Monitor Alarm Parameters    Gastrointestinal   Abdominal (WDL) WDL   Genitourinary   Genitourinary (WDL) X   Urine Assessment   Urinary Status Incontinent   Urinary Incontinence Present   Peripheral Vascular   Peripheral Vascular (WDL) WDL   Edema Right lower extremity; Left lower extremity   RLE Edema Trace   LLE Edema Trace   Skin Integumentary    Skin Integumentary (WDL) X   Skin Color Red   Skin Condition/Temp Warm;Dry   Skin Integrity Ecchymosis; Rash   Location scattered   Skin Integrity Site 2   Skin Integrity Location 2 Tear   Location 2 lt forearm   Skin Integrity Site 3   Skin Integrity Location 3 Tear
12/19/23 2019   Assessment   Charting Type Shift assessment   Psychosocial   Psychosocial (WDL) WDL   Neurological   Neuro (WDL) X   Level of Consciousness 0   Orientation Level Oriented to person;Disoriented to place; Disoriented to situation;Disoriented to time   Cognition Appropriate attention/concentration; Follows commands   Speech Clear   [place] Coma Scale   Eye Opening 4   Best Verbal Response 4   Best Motor Response 6   [place] Coma Scale Score 14   HEENT (Head, Ears, Eyes, Nose, & Throat)   HEENT (WDL) X   Right Eye Impaired vision   Left Eye Impaired vision   Right Ear Impaired hearing   Left Ear Impaired hearing   Mucous Membrane Dry   Neck Swollen   Respiratory   Respiratory (WDL) X   Respiratory Pattern Regular   Respiratory Depth Normal   Breath Sounds   Right Upper Lobe Clear   Right Middle Lobe Clear   Right Lower Lobe Diminished   Left Upper Lobe Clear   Left Lower Lobe Diminished   Cardiac   Cardiac (WDL) X   Cardiac Regularity Regular   Heart Sounds S1, S2   Cardiac Rhythm Sinus rhythm   Cardiac Monitor   Telemetry Box Number    Alarm Audible Yes   Telemetry Monitor Alarm Parameters    Gastrointestinal   Abdominal (WDL) WDL   Genitourinary   Genitourinary (WDL) X   Urine Assessment   Urinary Status Incontinent   Peripheral Vascular   Peripheral Vascular (WDL) WDL   Edema Right lower extremity; Left lower extremity   RLE Edema +1   LLE Edema +1   Skin Integumentary    Skin Integumentary (WDL) X   Skin Color Red   Skin Condition/Temp Warm;Dry   Skin Integrity Ecchymosis;Abrasion;Rash   Location scattered   Skin Integrity Site 2   Skin Integrity Location 2 Tear   Location 2 LFA   Preventative Dressing Yes   Skin Integrity Site 3   Skin Integrity Location 3 Tear    Location 3 RFA   Preventative Dressing Yes   Skin Integrity Site 4   Skin Integrity Location 4 Rash;Redness   Location 4 scattered   Skin Integrity Site 5   Skin Integrity Location 5 Wound (see LDA)   Location 5 L heel
12/20/23 0458   Assessment   Charting Type Shift assessment   Psychosocial   Psychosocial (WDL) WDL   Neurological   Neuro (WDL) X   Level of Consciousness 0   Orientation Level Oriented X4   Cognition Appropriate attention/concentration; Follows commands   Speech Clear   [place] Coma Scale   Eye Opening 4   Best Verbal Response 5   Best Motor Response 6   [place] Coma Scale Score 15   HEENT (Head, Ears, Eyes, Nose, & Throat)   HEENT (WDL) X   Right Eye Impaired vision   Left Eye Impaired vision   Right Ear Impaired hearing   Left Ear Impaired hearing   Mucous Membrane Dry   Neck Swollen   Respiratory   Respiratory (WDL) X   Respiratory Pattern Regular   Respiratory Depth Normal   Breath Sounds   Right Upper Lobe Clear   Right Middle Lobe Clear   Right Lower Lobe Diminished   Left Upper Lobe Clear   Left Lower Lobe Diminished   Cardiac   Cardiac (WDL) X   Cardiac Regularity Regular   Heart Sounds S1, S2   Cardiac Rhythm Sinus rhythm   Cardiac Monitor   Telemetry Box Number    Alarm Audible Yes   Telemetry Monitor Alarm Parameters    Gastrointestinal   Abdominal (WDL) WDL   Genitourinary   Genitourinary (WDL) X   Urine Assessment   Urine Color Tea   Urine Appearance Cloudy   Peripheral Vascular   Peripheral Vascular (WDL) WDL   Edema Right lower extremity; Left lower extremity   Edema Generalized +1;Pitting   RLE Edema +1;Pitting   LLE Edema +1;Pitting   Skin Integumentary    Skin Integumentary (WDL) X   Skin Color Red   Skin Condition/Temp Warm;Dry   Skin Integrity Abrasion;Rash   Location scattered   Skin Integrity Site 2   Skin Integrity Location 2 Tear   Location 2 LFA   Skin Integrity Site 3   Skin Integrity Location 3 Tear    Location 3 RFA   Skin Integrity Site 4   Skin Integrity Location 4 Rash;Redness   Location 4 scattered   Skin Integrity Site 5   Skin Integrity Location 5 Wound (see LDA)   Location 5 L heel   Preventative Dressing Yes   Dressing Site Heel   Wound 12/15/23 Arm
12/20/23 0945   Assessment   Charting Type Shift assessment   Psychosocial   Psychosocial (WDL) WDL   Neurological   Neuro (WDL) X   Level of Consciousness 0   Orientation Level Oriented X4   Cognition Appropriate attention/concentration; Follows commands   Speech Clear   [place] Coma Scale   Eye Opening 4   Best Verbal Response 5   Best Motor Response 6   [place] Coma Scale Score 15   HEENT (Head, Ears, Eyes, Nose, & Throat)   HEENT (WDL) X   Right Eye Impaired vision   Left Eye Impaired vision   Right Ear Impaired hearing   Left Ear Impaired hearing   Mucous Membrane Dry   Neck Swollen   Respiratory   Respiratory (WDL) X   Respiratory Interventions Cough & deep breathe   Respiratory Pattern Regular   Respiratory Depth Normal   Breath Sounds   Right Upper Lobe Clear   Right Middle Lobe Clear   Right Lower Lobe Diminished   Left Upper Lobe Clear   Left Lower Lobe Diminished   Cardiac   Cardiac (WDL) X   Cardiac Regularity Regular   Heart Sounds S1, S2   Cardiac Rhythm Sinus rhythm   Cardiac Monitor   Cardiac/Telemetry Monitor On Portable telemetry pack applied   Telemetry Box Number    Alarm Audible Yes   Alarms Set Yes   Telemetry Monitor Alarm Parameters    Gastrointestinal   Abdominal (WDL) WDL   Genitourinary   Genitourinary (WDL) X   Urine Assessment   Urinary Status External catheter   Urinary Incontinence Present   Urine Color Tea   Peripheral Vascular   Peripheral Vascular (WDL) WDL   Edema Right lower extremity; Left lower extremity   Edema Generalized +1;Pitting   RLE Edema +1;Pitting   LLE Edema +1;Pitting   Skin Integumentary    Skin Integumentary (WDL) X   Skin Color Red   Skin Condition/Temp Dry; Warm   Skin Integrity Abrasion;Rash   Location scattered all over   Skin Integrity Site 2   Skin Integrity Location 2 Tear   Location 2 LFA   Preventative Dressing No   Assessed this shift Yes   Skin Integrity Site 3   Skin Integrity Location 3 Tear    Location 3 RFA   Preventative Dressing No
12/21/23 0948   Assessment   Charting Type Shift assessment   Psychosocial   Psychosocial (WDL) WDL   Neurological   Neuro (WDL) WDL   Level of Consciousness 0   Orientation Level Oriented X4   Speech Clear   [place] Coma Scale   Eye Opening 4   Best Verbal Response 5   Best Motor Response 6   [place] Coma Scale Score 15   HEENT (Head, Ears, Eyes, Nose, & Throat)   HEENT (WDL) X   Right Eye Impaired vision   Left Eye Impaired vision   Right Ear Impaired hearing   Left Ear Impaired hearing   Lips Dry   Mucous Membrane Dry   Neck Swollen   Respiratory   Respiratory (WDL) X   Respiratory Interventions Cough & deep breathe;H. O.B. elevated   Respiratory Pattern Regular   Respiratory Depth Normal   Breath Sounds   Right Upper Lobe Clear   Right Middle Lobe Clear   Right Lower Lobe Diminished   Left Upper Lobe Clear   Left Lower Lobe Diminished   Cardiac   Cardiac (WDL) X   Cardiac Regularity Regular   Cardiac Rhythm Sinus rhythm   Cardiac Monitor   Cardiac/Telemetry Monitor On Portable telemetry pack applied   Telemetry Box Number    Alarm Audible Yes   Alarms Set Yes   Electrodes Replaced Yes   Telemetry Monitor Alarm Parameters    Gastrointestinal   Abdominal (WDL) WDL   Genitourinary   Genitourinary (WDL) X   Urine Assessment   Urinary Status External catheter  (patient insists)   Urinary Incontinence Present   Peripheral Vascular   Peripheral Vascular (WDL) X   Edema Left lower extremity;Right lower extremity   Edema Generalized +1;Pitting   RLE Edema +1;Pitting   LLE Edema +1;Pitting   Skin Integumentary    Skin Integumentary (WDL) X   Skin Color Red   Skin Condition/Temp Dry; Warm   Skin Integrity Abrasion;Rash   Location scattered   Skin Integrity Site 2   Skin Integrity Location 2 Tear   Location 2 LFA   Preventative Dressing No   Assessed this shift Yes   Skin Integrity Site 3   Skin Integrity Location 3 Tear    Location 3 RFA   Preventative Dressing No   Assessed this shift Yes   Skin Integrity
12/22/23 0800   Assessment   Charting Type Shift assessment   Psychosocial   Psychosocial (WDL) WDL   Neurological   Neuro (WDL) WDL   Level of Consciousness 0   Orientation Level Oriented X4   Speech Clear   [place] Coma Scale   Eye Opening 4   Best Verbal Response 5   Best Motor Response 6   [place] Coma Scale Score 15   HEENT (Head, Ears, Eyes, Nose, & Throat)   HEENT (WDL) X   Right Eye Impaired vision   Left Eye Impaired vision   Right Ear Impaired hearing   Left Ear Impaired hearing   Mucous Membrane Dry   Neck Other (comment)  (goiter)   Respiratory   Respiratory (WDL) X   Respiratory Interventions Cough & deep breathe;H. O.B. elevated; Incentive spirometry   Respiratory Pattern Regular   Respiratory Depth Normal   Breath Sounds   Right Upper Lobe Clear   Right Middle Lobe Clear   Right Lower Lobe Diminished   Left Upper Lobe Clear   Left Lower Lobe Diminished   Cardiac   Cardiac (WDL) WDL   Cardiac Regularity Regular   Heart Sounds S1, S2   Cardiac Rhythm Sinus rhythm;Sinus tachy   Cardiac Monitor   Cardiac/Telemetry Monitor On Portable telemetry pack applied   Telemetry Box Number    Alarm Audible Yes   Telemetry Monitor Alarm Parameters    Gastrointestinal   Abdominal (WDL) WDL   Genitourinary   Genitourinary (WDL) WDL   Urine Assessment   Urinary Status External catheter   Urinary Incontinence Present   Urine Color Key   Urine Appearance Cloudy   Peripheral Vascular   Peripheral Vascular (WDL) X   Edema Left lower extremity;Right lower extremity   RLE Edema Pitting;+2   LLE Edema Pitting;+2   Skin Integumentary    Skin Integumentary (WDL) X   Skin Color Red   Skin Condition/Temp Warm;Dry;Flaky   Skin Integrity Rash;Redness   Location generalized   Skin Integrity Site 2   Skin Integrity Location 2 Other (comment); Abrasion   Location 2 bilateral buttocks   Preventative Dressing Yes   Dressing Site Sacrum   Date Applied 12/22/23   Assessed this shift Yes   Skin Integrity Site 3   Skin
12/22/23 2030   Assessment   Charting Type Shift assessment   Psychosocial   Psychosocial (WDL) WDL   Neurological   Neuro (WDL) WDL   Level of Consciousness 0   Orientation Level Oriented X4   Speech Clear   [place] Coma Scale   Eye Opening 4   Best Verbal Response 5   Best Motor Response 6   [place] Coma Scale Score 15   HEENT (Head, Ears, Eyes, Nose, & Throat)   HEENT (WDL) X   Right Eye Impaired vision   Left Eye Impaired vision   Right Ear Impaired hearing   Left Ear Impaired hearing   Mucous Membrane Dry   Neck Other (comment)  (goiter)   Respiratory   Respiratory (WDL) X   Respiratory Pattern Regular   Respiratory Depth Normal   Breath Sounds   Right Upper Lobe Clear   Right Middle Lobe Clear   Right Lower Lobe Diminished   Left Upper Lobe Clear   Left Lower Lobe Diminished   Cardiac   Cardiac (WDL) WDL   Cardiac Regularity Regular   Heart Sounds S1, S2   Cardiac Rhythm Sinus rhythm;Sinus tachy   Cardiac Monitor   Telemetry Box Number    Alarm Audible Yes   Telemetry Monitor Alarm Parameters    Gastrointestinal   Abdominal (WDL) WDL   Genitourinary   Genitourinary (WDL) WDL   Urine Assessment   Urine Color Key   Urine Appearance Cloudy   Peripheral Vascular   Peripheral Vascular (WDL) X   Edema Left lower extremity;Right lower extremity   RLE Edema Pitting;+2   LLE Edema Pitting;+2   Skin Integumentary    Skin Integumentary (WDL) X   Skin Color Pale   Skin Condition/Temp Warm;Dry;Flaky   Skin Integrity Rash;Redness   Location Generalized   Skin Integrity Site 2   Skin Integrity Location 2 Abrasion   Location 2 Bilateral Buttocks   Preventative Dressing Yes   Dressing Site Sacrum   Skin Integrity Site 3   Skin Integrity Location 3 Tear    Location 3 Right Forearm   Skin Integrity Site 5   Skin Integrity Location 5 Wound (see LDA)   Location 5 Left Heel   Musculoskeletal   Musculoskeletal (WDL) X   RL Extremity Weakness; Unsteady   LL Extremity Weakness; Unsteady   Wound 12/15/23 Arm
12/24/23 0125   Assessment   Charting Type Shift assessment   Psychosocial   Psychosocial (WDL) WDL   Neurological   Neuro (WDL) WDL   Level of Consciousness 0   Orientation Level Oriented X4   Cognition Appropriate attention/concentration; Follows commands   Speech Clear   [place] Coma Scale   Eye Opening 4   Best Verbal Response 5   Best Motor Response 6   [place] Coma Scale Score 15   NIHSS Stroke Scale   NIHSS Stroke Scale Assessed No   HEENT (Head, Ears, Eyes, Nose, & Throat)   HEENT (WDL) X   Right Eye Impaired vision   Left Eye Impaired vision   Right Ear Impaired hearing   Left Ear Impaired hearing   Lips Dry   Respiratory   Respiratory (WDL) X   Respiratory Interventions H.O.B. elevated   Respiratory Pattern Regular   Respiratory Depth Normal   Breath Sounds   Right Upper Lobe Clear   Right Middle Lobe Clear   Right Lower Lobe Diminished   Left Upper Lobe Clear   Left Lower Lobe Diminished   Cardiac   Cardiac (WDL) WDL   Cardiac Regularity Regular   Heart Sounds S1, S2   Rhythm Interpretation   Pulse (!) 105   Cardiac Monitor   Telemetry Box Number    Alarm Audible Yes   Alarms Set Yes   Telemetry Monitor Alarm Parameters    Gastrointestinal   Abdominal (WDL) WDL   Last BM (including prior to admit) 12/23/23   Genitourinary   Genitourinary (WDL) WDL   Urine Assessment   Urinary Status External catheter   Urinary Incontinence Present   Peripheral Vascular   Peripheral Vascular (WDL) X   Edema Left lower extremity;Right lower extremity   RLE Edema Pitting;+2   LLE Edema Pitting;+2   Skin Integumentary    Skin Integumentary (WDL) X   Skin Color Red   Skin Condition/Temp Warm;Dry   Skin Integrity Rash;Redness   Location generalized   Skin Integrity Site 2   Skin Integrity Location 2 Abrasion   Location 2 bilateral buttocks   Preventative Dressing Yes   Dressing Site Sacrum   Date Applied 12/23/23   Assessed this shift Yes   Skin Integrity Site 3   Skin Integrity Location 3 Tear    Location 3 rt
Integrity Site 4   Skin Integrity Location 4 Redness;Rash   Location 4 scattered   Preventative Dressing No   Assessed this shift Yes   Skin Integrity Site 5   Skin Integrity Location 5 Wound (see LDA)   Location 5 L Heel   Preventative Dressing Yes   Dressing Site Heel   Care Plan - Skin/Tissue Integrity Goals   Skin Integrity Remains Intact Monitor for areas of redness and/or skin breakdown   Wound 12/15/23 Arm Left; Lower; Posterior   Date First Assessed/Time First Assessed: 12/15/23 1630   Primary Wound Type: Skin Tear  Location: Arm  Wound Location Orientation: Left; Lower; Posterior   Wound Etiology Skin Tear   Dressing Status Clean;Dry; Intact   Dressing/Treatment Open to air   Wound 12/15/23 Arm Distal;Lower; Posterior;Right   Date First Assessed/Time First Assessed: 12/15/23 1630   Primary Wound Type: Skin Tear  Location: Arm  Wound Location Orientation: Distal;Lower; Posterior;Right   Wound Etiology Skin Tear   Dressing/Treatment Open to air   Care Plan - Discharge Planning Goals   Discharge to home or other facility with appropriate resources Identify barriers to discharge with patient and caregiver; Identify discharge learning needs (meds, wound care, etc)     Pt is alert and oriented to person. Pt is resting in bed and appears to have no acute distress. Pt currently on 2 L NC. Pt currently on telemetry monitoring showing NSR. Pt's lung sounds are clear and diminished. Pt encouraged to cough and deep breathe. Pt call bell and bedside table within reach.
Primary Wound Type: Skin Tear  Location: Arm  Wound Location Orientation: Distal;Lower; Posterior;Right   Wound Etiology Skin Tear   Dressing Status   ([facility])

## 2023-12-27 LAB
GAMMA INTERFERON BACKGROUND BLD IA-ACNC: 0.03 IU/ML
MITOGEN IGNF BCKGRD COR BLD-ACNC: >10 IU/ML
QUANTI TB GOLD PLUS: NEGATIVE
QUANTI TB1 MINUS NIL: 0 IU/ML (ref 0–0.34)
QUANTI TB2 MINUS NIL: 0 IU/ML (ref 0–0.34)

## 2023-12-29 ENCOUNTER — CARE COORDINATION (OUTPATIENT)
Dept: PRIMARY CARE CLINIC | Age: 78
End: 2023-12-29

## 2024-03-03 ASSESSMENT — PATIENT HEALTH QUESTIONNAIRE - PHQ9
SUM OF ALL RESPONSES TO PHQ9 QUESTIONS 1 & 2: 0
2. FEELING DOWN, DEPRESSED OR HOPELESS: 0
SUM OF ALL RESPONSES TO PHQ9 QUESTIONS 1 & 2: 0
SUM OF ALL RESPONSES TO PHQ QUESTIONS 1-9: 0
1. LITTLE INTEREST OR PLEASURE IN DOING THINGS: 0
SUM OF ALL RESPONSES TO PHQ QUESTIONS 1-9: 0
2. FEELING DOWN, DEPRESSED OR HOPELESS: NOT AT ALL
SUM OF ALL RESPONSES TO PHQ QUESTIONS 1-9: 0
1. LITTLE INTEREST OR PLEASURE IN DOING THINGS: NOT AT ALL
SUM OF ALL RESPONSES TO PHQ QUESTIONS 1-9: 0

## 2024-03-04 ENCOUNTER — OFFICE VISIT (OUTPATIENT)
Dept: PRIMARY CARE CLINIC | Age: 79
End: 2024-03-04
Payer: MEDICARE

## 2024-03-04 ENCOUNTER — HOSPITAL ENCOUNTER (OUTPATIENT)
Facility: HOSPITAL | Age: 79
Discharge: HOME | End: 2024-03-04
Payer: MEDICARE

## 2024-03-04 VITALS
HEART RATE: 119 BPM | BODY MASS INDEX: 21.78 KG/M2 | HEIGHT: 64 IN | OXYGEN SATURATION: 98 % | WEIGHT: 127.6 LBS | TEMPERATURE: 98.5 F | SYSTOLIC BLOOD PRESSURE: 136 MMHG | RESPIRATION RATE: 14 BRPM | DIASTOLIC BLOOD PRESSURE: 86 MMHG

## 2024-03-04 DIAGNOSIS — L40.8 ERYTHRODERMIC PSORIASIS: Primary | ICD-10-CM

## 2024-03-04 DIAGNOSIS — E55.9 VITAMIN D DEFICIENCY: ICD-10-CM

## 2024-03-04 DIAGNOSIS — R94.5 ABNORMAL RESULTS OF LIVER FUNCTION STUDIES: ICD-10-CM

## 2024-03-04 DIAGNOSIS — R74.9 ABNORMAL SERUM ENZYME LEVEL, UNSPECIFIED: ICD-10-CM

## 2024-03-04 DIAGNOSIS — R53.81 DEBILITY: ICD-10-CM

## 2024-03-04 DIAGNOSIS — Z13.29 THYROID DISORDER SCREEN: ICD-10-CM

## 2024-03-04 DIAGNOSIS — R00.0 TACHYCARDIA: ICD-10-CM

## 2024-03-04 DIAGNOSIS — L29.9 ITCHING: ICD-10-CM

## 2024-03-04 DIAGNOSIS — L40.8 ERYTHRODERMIC PSORIASIS: ICD-10-CM

## 2024-03-04 DIAGNOSIS — F51.01 PRIMARY INSOMNIA: ICD-10-CM

## 2024-03-04 DIAGNOSIS — R79.89 ELEVATED LIVER FUNCTION TESTS: ICD-10-CM

## 2024-03-04 LAB
25(OH)D3 SERPL-MCNC: 47.8 NG/ML (ref 32–100)
ALBUMIN SERPL-MCNC: 3.4 G/DL (ref 3.4–4.8)
ALBUMIN/GLOB SERPL: 0.8 {RATIO} (ref 0.8–2)
ALP SERPL-CCNC: 382 U/L (ref 25–100)
ALT SERPL-CCNC: 56 U/L (ref 4–36)
ANION GAP SERPL CALCULATED.3IONS-SCNC: 11 MMOL/L (ref 3–16)
AST SERPL-CCNC: 64 U/L (ref 8–33)
BILIRUB SERPL-MCNC: 0.3 MG/DL (ref 0.3–1.2)
BUN SERPL-MCNC: 15 MG/DL (ref 6–20)
CALCIUM SERPL-MCNC: 9.7 MG/DL (ref 8.5–10.5)
CHLORIDE SERPL-SCNC: 98 MMOL/L (ref 98–107)
CO2 SERPL-SCNC: 25 MMOL/L (ref 20–30)
CREAT SERPL-MCNC: 0.8 MG/DL (ref 0.4–1.2)
ERYTHROCYTE [DISTWIDTH] IN BLOOD BY AUTOMATED COUNT: 18 % (ref 11–16)
FOLATE SERPL-MCNC: 9.9 NG/ML
GFR SERPLBLD CREATININE-BSD FMLA CKD-EPI: >60 ML/MIN/{1.73_M2}
GLOBULIN SER CALC-MCNC: 4.2 G/DL
GLUCOSE SERPL-MCNC: 91 MG/DL (ref 74–106)
HCT VFR BLD AUTO: 37.2 % (ref 37–47)
HGB BLD-MCNC: 11.3 G/DL (ref 11.5–16.5)
INR PPP: 0.95 (ref 0.87–1.11)
MCH RBC QN AUTO: 27.1 PG (ref 27–32)
MCHC RBC AUTO-ENTMCNC: 30.4 G/DL (ref 31–35)
MCV RBC AUTO: 89.2 FL (ref 80–100)
PLATELET # BLD AUTO: 518 K/UL (ref 150–400)
PMV BLD AUTO: 9.8 FL (ref 6–10)
POTASSIUM SERPL-SCNC: 5 MMOL/L (ref 3.4–5.1)
PROT SERPL-MCNC: 7.6 G/DL (ref 6.4–8.3)
PROTHROMBIN TIME: 12.5 SEC (ref 11.8–14.2)
RBC # BLD AUTO: 4.17 M/UL (ref 3.8–5.8)
SODIUM SERPL-SCNC: 134 MMOL/L (ref 136–145)
VIT B12 SERPL-MCNC: 537 PG/ML (ref 211–911)
WBC # BLD AUTO: 10 K/UL (ref 4–11)

## 2024-03-04 PROCEDURE — 1123F ACP DISCUSS/DSCN MKR DOCD: CPT | Performed by: NURSE PRACTITIONER

## 2024-03-04 PROCEDURE — 85027 COMPLETE CBC AUTOMATED: CPT

## 2024-03-04 PROCEDURE — 85610 PROTHROMBIN TIME: CPT

## 2024-03-04 PROCEDURE — 80053 COMPREHEN METABOLIC PANEL: CPT

## 2024-03-04 PROCEDURE — 3079F DIAST BP 80-89 MM HG: CPT | Performed by: NURSE PRACTITIONER

## 2024-03-04 PROCEDURE — 1036F TOBACCO NON-USER: CPT | Performed by: NURSE PRACTITIONER

## 2024-03-04 PROCEDURE — 1090F PRES/ABSN URINE INCON ASSESS: CPT | Performed by: NURSE PRACTITIONER

## 2024-03-04 PROCEDURE — 82607 VITAMIN B-12: CPT

## 2024-03-04 PROCEDURE — G8427 DOCREV CUR MEDS BY ELIG CLIN: HCPCS | Performed by: NURSE PRACTITIONER

## 2024-03-04 PROCEDURE — 99214 OFFICE O/P EST MOD 30 MIN: CPT | Performed by: NURSE PRACTITIONER

## 2024-03-04 PROCEDURE — 82977 ASSAY OF GGT: CPT

## 2024-03-04 PROCEDURE — 82746 ASSAY OF FOLIC ACID SERUM: CPT

## 2024-03-04 PROCEDURE — G8420 CALC BMI NORM PARAMETERS: HCPCS | Performed by: NURSE PRACTITIONER

## 2024-03-04 PROCEDURE — G8484 FLU IMMUNIZE NO ADMIN: HCPCS | Performed by: NURSE PRACTITIONER

## 2024-03-04 PROCEDURE — G8399 PT W/DXA RESULTS DOCUMENT: HCPCS | Performed by: NURSE PRACTITIONER

## 2024-03-04 PROCEDURE — 82306 VITAMIN D 25 HYDROXY: CPT

## 2024-03-04 PROCEDURE — 3075F SYST BP GE 130 - 139MM HG: CPT | Performed by: NURSE PRACTITIONER

## 2024-03-04 RX ORDER — HYDROXYZINE PAMOATE 25 MG/1
25 CAPSULE ORAL 3 TIMES DAILY PRN
Qty: 60 CAPSULE | Refills: 0 | Status: SHIPPED | OUTPATIENT
Start: 2024-03-04 | End: 2024-04-03

## 2024-03-04 RX ORDER — METOPROLOL SUCCINATE 25 MG/1
25 TABLET, EXTENDED RELEASE ORAL DAILY
Qty: 90 TABLET | Refills: 1 | Status: SHIPPED | OUTPATIENT
Start: 2024-03-04

## 2024-03-04 RX ORDER — IPRATROPIUM BROMIDE AND ALBUTEROL SULFATE 2.5; .5 MG/3ML; MG/3ML
1 SOLUTION RESPIRATORY (INHALATION) EVERY 6 HOURS PRN
COMMUNITY
Start: 2023-12-30

## 2024-03-04 RX ORDER — DUPILUMAB 300 MG/2ML
300 INJECTION, SOLUTION SUBCUTANEOUS
COMMUNITY
Start: 2024-02-19

## 2024-03-04 ASSESSMENT — ENCOUNTER SYMPTOMS
EYES NEGATIVE: 1
RESPIRATORY NEGATIVE: 1
ALLERGIC/IMMUNOLOGIC NEGATIVE: 1
GASTROINTESTINAL NEGATIVE: 1
COLOR CHANGE: 1

## 2024-03-04 NOTE — PROGRESS NOTES
Negative.    Endocrine: Negative.    Genitourinary: Negative.    Musculoskeletal: Negative.    Skin:  Positive for color change.   Allergic/Immunologic: Negative.    Neurological: Negative.    Hematological: Negative.    Psychiatric/Behavioral:  Positive for sleep disturbance.        OBJECTIVE:  /86 (Site: Left Upper Arm, Position: Sitting, Cuff Size: Small Adult)   Pulse (!) 119   Temp 98.5 °F (36.9 °C) (Temporal)   Resp 14   Ht 1.626 m (5' 4.02\")   Wt 57.9 kg (127 lb 9.6 oz)   SpO2 98% Comment: room air  BMI 21.89 kg/m²    Physical Exam  Vitals and nursing note reviewed.   Constitutional:       Appearance: She is well-developed.   HENT:      Head: Normocephalic and atraumatic.   Eyes:      Conjunctiva/sclera: Conjunctivae normal.      Pupils: Pupils are equal, round, and reactive to light.   Neck:      Thyroid: No thyromegaly.      Vascular: No JVD.   Cardiovascular:      Rate and Rhythm: Normal rate and regular rhythm.      Heart sounds: No murmur heard.     No friction rub. No gallop.   Pulmonary:      Effort: Pulmonary effort is normal. No respiratory distress.      Breath sounds: Normal breath sounds. No wheezing or rales.   Abdominal:      General: Bowel sounds are normal. There is no distension.      Palpations: Abdomen is soft.      Tenderness: There is no abdominal tenderness. There is no guarding.   Musculoskeletal:         General: No tenderness.      Cervical back: Normal range of motion and neck supple.   Skin:     General: Skin is warm and dry.      Findings: No rash.      Comments: Hair loss     Neurological:      Mental Status: She is alert and oriented to person, place, and time.      Motor: Weakness present.      Comments: Walking with a walker   Psychiatric:         Judgment: Judgment normal.         No results found for requested labs within last 30 days.       No results found for: \"LABA1C\", \"LDLCALC\", \"LDLDIRECT\"      Lab Results   Component Value Date/Time    WBC 11.7 12/24/2023

## 2024-03-04 NOTE — PATIENT INSTRUCTIONS
patches by folding them in half so that the sticky sides meet, and then flushing them down a toilet. They should not be placed in the household trash where children or pets can find them.  If you have any questions, ask your provider or pharmacist for more information.   Be sure to keep all appointments for provider visits or tests. We are committed to providing you with the best care possible.   In order to help us achieve these goals please remember to bring all medications, herbal products, and over the counter supplements with you to each visit.     If your provider has ordered testing for you, please be sure to follow up with our office if you have not received results within 7 days after the testing took place.     *If you receive a survey after visiting one of our offices, please take time to share your experience concerning your physician office visit. These surveys are confidential and no health information about you is shared.  We are eager to improve for you and we are counting on your feedback to help make that happen. Thank you for enrolling in [facility]. Please follow the instructions below to securely access your online medical record. [facility] allows you to send messages to your doctor, view your test results, renew your prescriptions, schedule appointments, and more.     How Do I Sign Up?  In your Internet browser, go to [email or web address]  Click on the Sign Up Now link in the Sign In box. You will see the New Member Sign Up page.  Enter your [facility] Access Code exactly as it appears below. You will not need to use this code after you’ve completed the sign-up process. If you do not sign up before the expiration date, you must request a new code.  [facility] Access Code: Activation code not generated  Current [facility] Status: Active    Enter your Social Security Number ([id number]) and Date of Birth (mm/dd/yyyy) as indicated and click Submit. You will be taken to the next

## 2024-03-05 LAB — GGT SERPL-CCNC: 215 U/L (ref 5–36)

## 2024-03-06 DIAGNOSIS — R79.89 ELEVATED LIVER FUNCTION TESTS: Primary | ICD-10-CM

## 2024-03-06 DIAGNOSIS — H04.129 DRY EYE: Primary | ICD-10-CM

## 2024-03-06 RX ORDER — CARBOXYMETHYLCELLULOSE SODIUM 5 MG/ML
2 SOLUTION/ DROPS OPHTHALMIC PRN
Qty: 30 ML | Refills: 3 | Status: SHIPPED | OUTPATIENT
Start: 2024-03-06

## 2024-04-24 ENCOUNTER — OFFICE VISIT (OUTPATIENT)
Dept: PRIMARY CARE CLINIC | Age: 79
End: 2024-04-24
Payer: MEDICARE

## 2024-04-24 VITALS
BODY MASS INDEX: 22.61 KG/M2 | TEMPERATURE: 97.3 F | HEIGHT: 64 IN | HEART RATE: 88 BPM | SYSTOLIC BLOOD PRESSURE: 136 MMHG | WEIGHT: 132.4 LBS | DIASTOLIC BLOOD PRESSURE: 82 MMHG | RESPIRATION RATE: 16 BRPM | OXYGEN SATURATION: 95 %

## 2024-04-24 DIAGNOSIS — R00.0 TACHYCARDIA: ICD-10-CM

## 2024-04-24 DIAGNOSIS — L40.8 ERYTHRODERMIC PSORIASIS: ICD-10-CM

## 2024-04-24 DIAGNOSIS — R79.89 ELEVATED LIVER FUNCTION TESTS: Primary | ICD-10-CM

## 2024-04-24 DIAGNOSIS — Z13.29 THYROID DISORDER SCREEN: ICD-10-CM

## 2024-04-24 PROCEDURE — G8420 CALC BMI NORM PARAMETERS: HCPCS | Performed by: NURSE PRACTITIONER

## 2024-04-24 PROCEDURE — 3075F SYST BP GE 130 - 139MM HG: CPT | Performed by: NURSE PRACTITIONER

## 2024-04-24 PROCEDURE — 1123F ACP DISCUSS/DSCN MKR DOCD: CPT | Performed by: NURSE PRACTITIONER

## 2024-04-24 PROCEDURE — 3078F DIAST BP <80 MM HG: CPT | Performed by: NURSE PRACTITIONER

## 2024-04-24 PROCEDURE — G8427 DOCREV CUR MEDS BY ELIG CLIN: HCPCS | Performed by: NURSE PRACTITIONER

## 2024-04-24 PROCEDURE — G8399 PT W/DXA RESULTS DOCUMENT: HCPCS | Performed by: NURSE PRACTITIONER

## 2024-04-24 PROCEDURE — 1036F TOBACCO NON-USER: CPT | Performed by: NURSE PRACTITIONER

## 2024-04-24 PROCEDURE — 1090F PRES/ABSN URINE INCON ASSESS: CPT | Performed by: NURSE PRACTITIONER

## 2024-04-24 PROCEDURE — 99213 OFFICE O/P EST LOW 20 MIN: CPT | Performed by: NURSE PRACTITIONER

## 2024-04-24 RX ORDER — DOXEPIN HYDROCHLORIDE 10 MG/1
10 CAPSULE ORAL NIGHTLY
COMMUNITY
Start: 2024-04-22 | End: 2024-04-24

## 2024-04-24 NOTE — PROGRESS NOTES
Chief Complaint   Patient presents with    Erythodermic Psoriasis       Have you seen any other physician or provider since your last visit yes Dermatology on Monday    Have you had any other diagnostic tests since your last visit? yes - CT in [place]    Have you changed or stopped any medications since your last visit? Yes not taking Doxepin         SUBJECTIVE:    Patient ID:Rima Bourne is a 78 y.o. female.    Chief Complaint   Patient presents with    Erythodermic Psoriasis     HPI:  Patient is here to follow up on her psoriasis. She is still using her lotion and is doing much better. She is now on dupixent.    Her weight is slowly improving.  She is up 5 pounds.     She was seen by Dr. Alonso patterson. Felt she was improving. '  She is walking without her walker  Patient is using Melatonin to help her sleep.She has a bottle of Doxepin but she has not been taking this. She states she is sleeping well.        At homes she states her blood pressure was 131/73   she is taking the Metoprolol daily.   Patient's medications, allergies, past medical, surgical, social and family histories were reviewed and updated as appropriate.          Review of Systems   Constitutional: Negative.    HENT: Negative.     Eyes: Negative.    Respiratory: Negative.     Cardiovascular: Negative.    Gastrointestinal: Negative.    Endocrine: Negative.    Genitourinary: Negative.    Musculoskeletal: Negative.    Skin: Negative.    Allergic/Immunologic: Negative.    Neurological: Negative.    Hematological: Negative.    Psychiatric/Behavioral: Negative.         OBJECTIVE:  /82 (Site: Left Upper Arm, Position: Sitting, Cuff Size: Medium Adult)   Pulse 88   Temp 97.3 °F (36.3 °C) (Temporal)   Resp 16   Ht 1.626 m (5' 4.02\")   Wt 60.1 kg (132 lb 6.4 oz)   SpO2 95% Comment: room air  BMI 22.72 kg/m²    Physical Exam  Vitals and nursing note reviewed.   Constitutional:       Appearance: She is well-developed.   HENT:      Head:

## 2024-06-24 NOTE — ASSESSMENT & PLAN NOTE
Resolved with better blood pressure control and Allegra.  Patient may hold Allegra at the present time.   Speaking Coherently

## 2024-08-22 SDOH — HEALTH STABILITY: PHYSICAL HEALTH: ON AVERAGE, HOW MANY MINUTES DO YOU ENGAGE IN EXERCISE AT THIS LEVEL?: 20 MIN

## 2024-08-22 SDOH — HEALTH STABILITY: PHYSICAL HEALTH: ON AVERAGE, HOW MANY DAYS PER WEEK DO YOU ENGAGE IN MODERATE TO STRENUOUS EXERCISE (LIKE A BRISK WALK)?: 3 DAYS

## 2024-08-22 ASSESSMENT — LIFESTYLE VARIABLES
HOW MANY STANDARD DRINKS CONTAINING ALCOHOL DO YOU HAVE ON A TYPICAL DAY: PATIENT DOES NOT DRINK
HOW MANY STANDARD DRINKS CONTAINING ALCOHOL DO YOU HAVE ON A TYPICAL DAY: 0
HOW OFTEN DO YOU HAVE A DRINK CONTAINING ALCOHOL: NEVER
HOW OFTEN DO YOU HAVE SIX OR MORE DRINKS ON ONE OCCASION: 1
HOW OFTEN DO YOU HAVE A DRINK CONTAINING ALCOHOL: 1

## 2024-08-22 ASSESSMENT — PATIENT HEALTH QUESTIONNAIRE - PHQ9
SUM OF ALL RESPONSES TO PHQ QUESTIONS 1-9: 0
SUM OF ALL RESPONSES TO PHQ9 QUESTIONS 1 & 2: 0
1. LITTLE INTEREST OR PLEASURE IN DOING THINGS: NOT AT ALL
SUM OF ALL RESPONSES TO PHQ QUESTIONS 1-9: 0
SUM OF ALL RESPONSES TO PHQ QUESTIONS 1-9: 0
2. FEELING DOWN, DEPRESSED OR HOPELESS: NOT AT ALL
SUM OF ALL RESPONSES TO PHQ QUESTIONS 1-9: 0

## 2024-08-26 ENCOUNTER — HOSPITAL ENCOUNTER (OUTPATIENT)
Facility: HOSPITAL | Age: 79
Discharge: HOME | End: 2024-08-26
Payer: MEDICARE

## 2024-08-26 ENCOUNTER — OFFICE VISIT (OUTPATIENT)
Dept: PRIMARY CARE CLINIC | Age: 79
End: 2024-08-26

## 2024-08-26 VITALS
OXYGEN SATURATION: 98 % | DIASTOLIC BLOOD PRESSURE: 74 MMHG | RESPIRATION RATE: 16 BRPM | HEART RATE: 95 BPM | BODY MASS INDEX: 24.38 KG/M2 | TEMPERATURE: 97.6 F | SYSTOLIC BLOOD PRESSURE: 148 MMHG | WEIGHT: 142.8 LBS | HEIGHT: 64 IN

## 2024-08-26 DIAGNOSIS — J02.8 ACUTE BACTERIAL PHARYNGITIS: ICD-10-CM

## 2024-08-26 DIAGNOSIS — J30.9 ALLERGIC RHINITIS, UNSPECIFIED SEASONALITY, UNSPECIFIED TRIGGER: Primary | ICD-10-CM

## 2024-08-26 DIAGNOSIS — Z00.00 INITIAL MEDICARE ANNUAL WELLNESS VISIT: ICD-10-CM

## 2024-08-26 DIAGNOSIS — Z13.29 THYROID DISORDER SCREEN: ICD-10-CM

## 2024-08-26 DIAGNOSIS — R79.89 ELEVATED LIVER FUNCTION TESTS: ICD-10-CM

## 2024-08-26 DIAGNOSIS — B96.89 ACUTE BACTERIAL PHARYNGITIS: ICD-10-CM

## 2024-08-26 DIAGNOSIS — R00.0 TACHYCARDIA: ICD-10-CM

## 2024-08-26 LAB
ALBUMIN SERPL-MCNC: 3.8 G/DL (ref 3.4–4.8)
ALBUMIN/GLOB SERPL: 1.2 {RATIO} (ref 0.8–2)
ALP SERPL-CCNC: 130 U/L (ref 25–100)
ALT SERPL-CCNC: 23 U/L (ref 4–36)
ANION GAP SERPL CALCULATED.3IONS-SCNC: 11 MMOL/L (ref 3–16)
AST SERPL-CCNC: 29 U/L (ref 8–33)
BILIRUB SERPL-MCNC: <0.2 MG/DL (ref 0.3–1.2)
BUN SERPL-MCNC: 17 MG/DL (ref 6–20)
CALCIUM SERPL-MCNC: 9.2 MG/DL (ref 8.5–10.5)
CHLORIDE SERPL-SCNC: 101 MMOL/L (ref 98–107)
CO2 SERPL-SCNC: 26 MMOL/L (ref 20–30)
CREAT SERPL-MCNC: 0.9 MG/DL (ref 0.4–1.2)
ERYTHROCYTE [DISTWIDTH] IN BLOOD BY AUTOMATED COUNT: 13.7 % (ref 11–16)
GFR SERPLBLD CREATININE-BSD FMLA CKD-EPI: 65 ML/MIN/{1.73_M2}
GLOBULIN SER CALC-MCNC: 3.2 G/DL
GLUCOSE SERPL-MCNC: 98 MG/DL (ref 74–106)
HCT VFR BLD AUTO: 43.5 % (ref 37–47)
HGB BLD-MCNC: 13.7 G/DL (ref 11.5–16.5)
INR PPP: 0.98 (ref 0.9–1.1)
MCH RBC QN AUTO: 29 PG (ref 27–32)
MCHC RBC AUTO-ENTMCNC: 31.5 G/DL (ref 31–35)
MCV RBC AUTO: 92 FL (ref 80–100)
PLATELET # BLD AUTO: 296 K/UL (ref 150–400)
PMV BLD AUTO: 10.3 FL (ref 6–10)
POTASSIUM SERPL-SCNC: 5 MMOL/L (ref 3.4–5.1)
PROT SERPL-MCNC: 7 G/DL (ref 6.4–8.3)
PROTHROMBIN TIME: 12.9 SEC (ref 12.1–14)
RBC # BLD AUTO: 4.73 M/UL (ref 3.8–5.8)
SODIUM SERPL-SCNC: 138 MMOL/L (ref 136–145)
TSH SERPL DL<=0.005 MIU/L-ACNC: 2.83 UIU/ML (ref 0.27–4.2)
WBC # BLD AUTO: 7.7 K/UL (ref 4–11)

## 2024-08-26 PROCEDURE — 85610 PROTHROMBIN TIME: CPT

## 2024-08-26 PROCEDURE — 80053 COMPREHEN METABOLIC PANEL: CPT

## 2024-08-26 PROCEDURE — 85027 COMPLETE CBC AUTOMATED: CPT

## 2024-08-26 PROCEDURE — 84443 ASSAY THYROID STIM HORMONE: CPT

## 2024-08-26 RX ORDER — METOPROLOL SUCCINATE 25 MG/1
25 TABLET, EXTENDED RELEASE ORAL DAILY
Qty: 90 TABLET | Refills: 1 | Status: SHIPPED | OUTPATIENT
Start: 2024-08-26

## 2024-08-26 RX ORDER — LORATADINE 10 MG/1
10 CAPSULE, LIQUID FILLED ORAL DAILY
COMMUNITY

## 2024-08-26 RX ORDER — LEVOCETIRIZINE DIHYDROCHLORIDE 5 MG/1
5 TABLET, FILM COATED ORAL NIGHTLY
Qty: 30 TABLET | Refills: 2 | Status: SHIPPED | OUTPATIENT
Start: 2024-08-26

## 2024-08-26 RX ORDER — LORATADINE 10 MG/1
10 CAPSULE, LIQUID FILLED ORAL DAILY
Qty: 30 CAPSULE | Refills: 5 | Status: CANCELLED | OUTPATIENT
Start: 2024-08-26

## 2024-08-26 RX ORDER — AMOXICILLIN 875 MG
875 TABLET ORAL 2 TIMES DAILY
Qty: 14 TABLET | Refills: 0 | Status: SHIPPED | OUTPATIENT
Start: 2024-08-26 | End: 2024-09-02

## 2024-08-26 NOTE — PROGRESS NOTES
Chief Complaint   Patient presents with    Medicare AWV       Have you seen any other physician or provider since your last visit no    Have you had any other diagnostic tests since your last visit? no    Have you changed or stopped any medications since your last visit? no     Patient is here for her AWV. She has had a sore throat for 2 days. She has been taking Claritin but does not think it is working well.

## 2024-08-26 NOTE — PROGRESS NOTES
Medicare Annual Wellness Visit    Rima Bourne is here for Medicare AWV    Assessment & Plan   Allergic rhinitis, unspecified seasonality, unspecified trigger  Tachycardia  -     metoprolol succinate (TOPROL XL) 25 MG extended release tablet; Take 1 tablet by mouth daily, Disp-90 tablet, R-1Normal  Acute bacterial pharyngitis  -     amoxicillin (AMOXIL) 875 MG tablet; Take 1 tablet by mouth 2 times daily for 7 days, Disp-14 tablet, R-0Normal  Initial Medicare annual wellness visit    Recommendations for Preventive Services Due: see orders and patient instructions/AVS.  Recommended screening schedule for the next 5-10 years is provided to the patient in written form: see Patient Instructions/AVS.     Return in about 4 months (around 12/26/2024).     Subjective   The following acute and/or chronic problems were also addressed today:  She is questioning taking the Dupixent.  She says she has white coat htn.  It is fine at home.   Today she is complaining of a sore throat that started yesterday.   Patient's complete Health Risk Assessment and screening values have been reviewed and are found in Flowsheets. The following problems were reviewed today and where indicated follow up appointments were made and/or referrals ordered.    Positive Risk Factor Screenings with Interventions:    Fall Risk:  Do you feel unsteady or are you worried about falling? : (!) yes  2 or more falls in past year?: no  Fall with injury in past year?: no     Interventions:    Reviewed medications, home hazards, visual acuity, and co-morbidities that can increase risk for falls                     Advanced Directives:  Do you have a Living Will?: (!) No    Intervention:  has NO advanced directive - information provided                     Objective   Vitals:    08/26/24 1005 08/26/24 1016   BP: (!) 156/74 (!) 148/74   Site: Left Upper Arm Left Upper Arm   Position: Sitting Sitting   Cuff Size: Medium Adult Medium Adult   Pulse: 95    Resp: 16

## 2024-08-30 DIAGNOSIS — R00.0 TACHYCARDIA: ICD-10-CM

## 2024-08-30 RX ORDER — METOPROLOL SUCCINATE 25 MG/1
25 TABLET, EXTENDED RELEASE ORAL DAILY
Qty: 90 TABLET | Refills: 1 | OUTPATIENT
Start: 2024-08-30

## 2024-11-20 ENCOUNTER — LAB (OUTPATIENT)
Age: 79
End: 2024-11-20

## 2024-11-20 DIAGNOSIS — Z23 NEED FOR INFLUENZA VACCINATION: Primary | ICD-10-CM

## 2024-11-20 RX ORDER — LEVOCETIRIZINE DIHYDROCHLORIDE 5 MG/1
5 TABLET, FILM COATED ORAL NIGHTLY
Qty: 30 TABLET | Refills: 5 | Status: SHIPPED | OUTPATIENT
Start: 2024-11-20

## 2024-11-20 RX ORDER — LEVOCETIRIZINE DIHYDROCHLORIDE 5 MG/1
5 TABLET, FILM COATED ORAL NIGHTLY
Qty: 30 TABLET | Refills: 2 | Status: CANCELLED | OUTPATIENT
Start: 2024-11-20

## 2024-11-20 SDOH — ECONOMIC STABILITY: FOOD INSECURITY: WITHIN THE PAST 12 MONTHS, THE FOOD YOU BOUGHT JUST DIDN'T LAST AND YOU DIDN'T HAVE MONEY TO GET MORE.: NEVER TRUE

## 2024-11-20 SDOH — ECONOMIC STABILITY: INCOME INSECURITY: HOW HARD IS IT FOR YOU TO PAY FOR THE VERY BASICS LIKE FOOD, HOUSING, MEDICAL CARE, AND HEATING?: NOT HARD AT ALL

## 2024-11-20 SDOH — ECONOMIC STABILITY: FOOD INSECURITY: WITHIN THE PAST 12 MONTHS, YOU WORRIED THAT YOUR FOOD WOULD RUN OUT BEFORE YOU GOT MONEY TO BUY MORE.: NEVER TRUE

## 2024-11-20 NOTE — PROGRESS NOTES
Vaccine Information Sheet, \"Influenza - Inactivated\"  given to Rima Bourne, or parent/legal guardian of  Rima Bourne and verbalized understanding.    Patient responses:    Have you ever had a reaction to a flu vaccine? No  Do you have any current illness?  No  Have you ever had Guillian [place] Syndrome?  No  Do you have a serious allergy to any of the follow: Neomycin, Polymyxin, Thimerosal, eggs or egg products? No    Flu vaccine given per order. Please see immunization tab.    Risks and benefits explained.  Current VIS given.      Immunizations Administered       Name Date Dose Route    Influenza, FLUAD, (age 65 y+), IM, Trivalent PF, 0.5mL 11/20/2024 0.5 mL Intramuscular    Site: Deltoid- Left    Lot: [id number]    NDC: 34501-863-73

## 2024-12-27 ENCOUNTER — OFFICE VISIT (OUTPATIENT)
Age: 79
End: 2024-12-27
Payer: MEDICARE

## 2024-12-27 VITALS
SYSTOLIC BLOOD PRESSURE: 164 MMHG | RESPIRATION RATE: 16 BRPM | HEART RATE: 66 BPM | BODY MASS INDEX: 25.33 KG/M2 | OXYGEN SATURATION: 94 % | WEIGHT: 148.4 LBS | HEIGHT: 64 IN | DIASTOLIC BLOOD PRESSURE: 82 MMHG | TEMPERATURE: 97 F

## 2024-12-27 DIAGNOSIS — R00.0 TACHYCARDIA: Primary | ICD-10-CM

## 2024-12-27 DIAGNOSIS — I10 PRIMARY HYPERTENSION: ICD-10-CM

## 2024-12-27 PROCEDURE — G8399 PT W/DXA RESULTS DOCUMENT: HCPCS | Performed by: NURSE PRACTITIONER

## 2024-12-27 PROCEDURE — 1159F MED LIST DOCD IN RCRD: CPT | Performed by: NURSE PRACTITIONER

## 2024-12-27 PROCEDURE — G8419 CALC BMI OUT NRM PARAM NOF/U: HCPCS | Performed by: NURSE PRACTITIONER

## 2024-12-27 PROCEDURE — 99214 OFFICE O/P EST MOD 30 MIN: CPT | Performed by: NURSE PRACTITIONER

## 2024-12-27 PROCEDURE — 3077F SYST BP >= 140 MM HG: CPT | Performed by: NURSE PRACTITIONER

## 2024-12-27 PROCEDURE — 1036F TOBACCO NON-USER: CPT | Performed by: NURSE PRACTITIONER

## 2024-12-27 PROCEDURE — 1123F ACP DISCUSS/DSCN MKR DOCD: CPT | Performed by: NURSE PRACTITIONER

## 2024-12-27 PROCEDURE — G8427 DOCREV CUR MEDS BY ELIG CLIN: HCPCS | Performed by: NURSE PRACTITIONER

## 2024-12-27 PROCEDURE — 1090F PRES/ABSN URINE INCON ASSESS: CPT | Performed by: NURSE PRACTITIONER

## 2024-12-27 PROCEDURE — 3079F DIAST BP 80-89 MM HG: CPT | Performed by: NURSE PRACTITIONER

## 2024-12-27 PROCEDURE — G8482 FLU IMMUNIZE ORDER/ADMIN: HCPCS | Performed by: NURSE PRACTITIONER

## 2024-12-27 RX ORDER — AMLODIPINE BESYLATE 2.5 MG/1
2.5 TABLET ORAL DAILY
Qty: 90 TABLET | Refills: 0 | Status: SHIPPED | OUTPATIENT
Start: 2024-12-27

## 2024-12-27 NOTE — PROGRESS NOTES
Chief Complaint   Patient presents with    Hypertension    Tachycardia       Have you seen any other physician or provider since your last visit no    Have you had any other diagnostic tests since your last visit? yes - labs    Have you changed or stopped any medications since your last visit? no        SUBJECTIVE:    Patient ID:Rima Bourne is a 79 y.o. female.    Chief Complaint   Patient presents with    Hypertension    Tachycardia     HPI:  Patient is here for her follow up on tachycardia and blood pressure. She has been taking her medication as prescribed.   She records her blood pressure at home and says it runs in the 130s-140s.    She denies any symptoms.  Says overall she is feeling much better.   Patient's medications, allergies, past medical, surgical, social and family histories were reviewed and updated as appropriate.          Review of Systems   Constitutional: Negative.    HENT: Negative.     Eyes: Negative.    Respiratory: Negative.     Cardiovascular: Negative.    Gastrointestinal: Negative.    Endocrine: Negative.    Genitourinary: Negative.    Musculoskeletal: Negative.    Skin: Negative.    Allergic/Immunologic: Negative.    Neurological: Negative.    Hematological: Negative.    Psychiatric/Behavioral: Negative.         OBJECTIVE:  BP (!) 164/82 (Site: Left Upper Arm, Position: Sitting, Cuff Size: Medium Adult)   Pulse 66   Temp 97 °F (36.1 °C) (Temporal)   Resp 16   Ht 1.626 m (5' 4.02\")   Wt 67.3 kg (148 lb 6.4 oz)   SpO2 94% Comment: room air  BMI 25.46 kg/m²    Physical Exam  Vitals and nursing note reviewed.   Constitutional:       Appearance: She is well-developed.   HENT:      Head: Normocephalic and atraumatic.   Eyes:      Conjunctiva/sclera: Conjunctivae normal.      Pupils: Pupils are equal, round, and reactive to light.   Neck:      Thyroid: No thyromegaly.      Vascular: No JVD.   Cardiovascular:      Rate and Rhythm: Normal rate and regular rhythm.      Heart sounds: No

## 2025-01-24 SDOH — ECONOMIC STABILITY: FOOD INSECURITY: WITHIN THE PAST 12 MONTHS, YOU WORRIED THAT YOUR FOOD WOULD RUN OUT BEFORE YOU GOT MONEY TO BUY MORE.: NEVER TRUE

## 2025-01-24 SDOH — ECONOMIC STABILITY: INCOME INSECURITY: IN THE LAST 12 MONTHS, WAS THERE A TIME WHEN YOU WERE NOT ABLE TO PAY THE MORTGAGE OR RENT ON TIME?: NO

## 2025-01-24 SDOH — ECONOMIC STABILITY: TRANSPORTATION INSECURITY
IN THE PAST 12 MONTHS, HAS THE LACK OF TRANSPORTATION KEPT YOU FROM MEDICAL APPOINTMENTS OR FROM GETTING MEDICATIONS?: NO

## 2025-01-24 SDOH — ECONOMIC STABILITY: FOOD INSECURITY: WITHIN THE PAST 12 MONTHS, THE FOOD YOU BOUGHT JUST DIDN'T LAST AND YOU DIDN'T HAVE MONEY TO GET MORE.: NEVER TRUE

## 2025-01-24 ASSESSMENT — PATIENT HEALTH QUESTIONNAIRE - PHQ9
SUM OF ALL RESPONSES TO PHQ QUESTIONS 1-9: 0
SUM OF ALL RESPONSES TO PHQ9 QUESTIONS 1 & 2: 0
SUM OF ALL RESPONSES TO PHQ QUESTIONS 1-9: 0
2. FEELING DOWN, DEPRESSED OR HOPELESS: NOT AT ALL
SUM OF ALL RESPONSES TO PHQ QUESTIONS 1-9: 0
SUM OF ALL RESPONSES TO PHQ9 QUESTIONS 1 & 2: 0
1. LITTLE INTEREST OR PLEASURE IN DOING THINGS: NOT AT ALL
1. LITTLE INTEREST OR PLEASURE IN DOING THINGS: NOT AT ALL
SUM OF ALL RESPONSES TO PHQ QUESTIONS 1-9: 0
2. FEELING DOWN, DEPRESSED OR HOPELESS: NOT AT ALL

## 2025-01-27 ENCOUNTER — OFFICE VISIT (OUTPATIENT)
Age: 80
End: 2025-01-27

## 2025-01-27 VITALS
HEART RATE: 75 BPM | DIASTOLIC BLOOD PRESSURE: 72 MMHG | WEIGHT: 152 LBS | BODY MASS INDEX: 26.08 KG/M2 | TEMPERATURE: 97.8 F | SYSTOLIC BLOOD PRESSURE: 136 MMHG

## 2025-01-27 DIAGNOSIS — H61.23 BILATERAL IMPACTED CERUMEN: ICD-10-CM

## 2025-01-27 DIAGNOSIS — E55.9 VITAMIN D DEFICIENCY: ICD-10-CM

## 2025-01-27 DIAGNOSIS — I10 PRIMARY HYPERTENSION: Primary | ICD-10-CM

## 2025-01-27 DIAGNOSIS — Z13.29 THYROID DISORDER SCREEN: ICD-10-CM

## 2025-01-27 DIAGNOSIS — R00.0 TACHYCARDIA: ICD-10-CM

## 2025-01-27 DIAGNOSIS — R79.89 ELEVATED LIVER FUNCTION TESTS: ICD-10-CM

## 2025-01-27 RX ORDER — AMLODIPINE BESYLATE 2.5 MG/1
2.5 TABLET ORAL DAILY
Qty: 90 TABLET | Refills: 2 | Status: SHIPPED | OUTPATIENT
Start: 2025-01-27

## 2025-01-27 ASSESSMENT — ENCOUNTER SYMPTOMS
BACK PAIN: 0
NAUSEA: 0
EYE DISCHARGE: 0
COUGH: 0
SHORTNESS OF BREATH: 0
ABDOMINAL PAIN: 0
VOMITING: 0
SINUS PRESSURE: 0
SORE THROAT: 0
WHEEZING: 0

## 2025-01-27 NOTE — PROGRESS NOTES
Lab Results   Component Value Date/Time    WBC 7.7 08/26/2024 11:17 AM    NEUTROABS 8.3 12/24/2023 04:51 AM    HGB 13.7 08/26/2024 11:17 AM    HCT 43.5 08/26/2024 11:17 AM    MCV 92.0 08/26/2024 11:17 AM     08/26/2024 11:17 AM       Lab Results   Component Value Date    TSH 2.83 08/26/2024         ASSESSMENT/PLAN:   Assessment & Plan  1. Blood pressure management.  Her blood pressure is well-regulated on the current dose of amlodipine. The current dosage of amlodipine will be maintained. A prescription for a 90-day supply has been sent to [facility].    2. Cerumen impaction.  Both ears are filled with wax, causing pain. Ear irrigation will be performed today to start the process of wax removal.    3. Weight gain.  She has gained 6 pounds since last month despite not eating much. No signs of swelling or fluid retention were noted. The weight gain is likely due to wearing winter clothes and is not a cause for concern.    Follow-up  The patient will follow up in 3 months.      1. Primary hypertension    - amLODIPine (NORVASC) 2.5 MG tablet; Take 1 tablet by mouth daily  Dispense: 90 tablet; Refill: 2  - Lipid Panel; Future  - Comprehensive Metabolic Panel; Future  - CBC; Future    2. Bilateral impacted cerumen    - REMOVE IMPACTED EAR WAX    3. Tachycardia    - amLODIPine (NORVASC) 2.5 MG tablet; Take 1 tablet by mouth daily  Dispense: 90 tablet; Refill: 2    4. Elevated liver function tests  Will monitor labs.     5. Vitamin D deficiency    - Vitamin B12 & Folate; Future  - Vitamin D 25 Hydroxy; Future    6. Thyroid disorder screen    - TSH; Future        Orders Placed This Encounter   Medications    amLODIPine (NORVASC) 2.5 MG tablet     Sig: Take 1 tablet by mouth daily     Dispense:  90 tablet     Refill:  2      I, Alexa Acosta MA am scribing for and in the presence of MELISSA Jacobs on this date of 1/27/2025 at 11:45am.

## 2025-01-28 ENCOUNTER — TELEPHONE (OUTPATIENT)
Age: 80
End: 2025-01-28

## 2025-01-28 DIAGNOSIS — H61.23 BILATERAL IMPACTED CERUMEN: Primary | ICD-10-CM

## 2025-01-28 NOTE — TELEPHONE ENCOUNTER
Patient called stated she was suppose to have ear drops sent to the pharmacy. Please [facility] to [facility]

## 2025-02-10 ENCOUNTER — LAB (OUTPATIENT)
Age: 80
End: 2025-02-10

## 2025-02-10 NOTE — PROGRESS NOTES
Patient is here to have her ears cleaned. She has been using drops. I extracted a large amount of wax from both ears.

## 2025-02-19 DIAGNOSIS — R00.0 TACHYCARDIA: ICD-10-CM

## 2025-02-19 RX ORDER — METOPROLOL SUCCINATE 25 MG/1
25 TABLET, EXTENDED RELEASE ORAL DAILY
Qty: 90 TABLET | Refills: 1 | Status: SHIPPED | OUTPATIENT
Start: 2025-02-19

## 2025-03-18 ENCOUNTER — HOSPITAL ENCOUNTER (OUTPATIENT)
Facility: HOSPITAL | Age: 80
Discharge: HOME | End: 2025-03-18
Payer: MEDICARE

## 2025-03-18 DIAGNOSIS — E55.9 VITAMIN D DEFICIENCY: ICD-10-CM

## 2025-03-18 DIAGNOSIS — Z13.29 THYROID DISORDER SCREEN: ICD-10-CM

## 2025-03-18 DIAGNOSIS — I10 PRIMARY HYPERTENSION: ICD-10-CM

## 2025-03-18 LAB
25(OH)D3 SERPL-MCNC: 27.7 NG/ML (ref 32–100)
ALBUMIN SERPL-MCNC: 4 G/DL (ref 3.4–4.8)
ALBUMIN/GLOB SERPL: 1.3 {RATIO} (ref 0.8–2)
ALP SERPL-CCNC: 115 U/L (ref 25–100)
ALT SERPL-CCNC: 29 U/L (ref 4–36)
ANION GAP SERPL CALCULATED.3IONS-SCNC: 11 MMOL/L (ref 3–16)
AST SERPL-CCNC: 30 U/L (ref 8–33)
BILIRUB SERPL-MCNC: <0.2 MG/DL (ref 0.3–1.2)
BUN SERPL-MCNC: 22 MG/DL (ref 6–20)
CALCIUM SERPL-MCNC: 9.5 MG/DL (ref 8.3–10.6)
CHLORIDE SERPL-SCNC: 103 MMOL/L (ref 98–107)
CHOLEST SERPL-MCNC: 210 MG/DL (ref 0–200)
CO2 SERPL-SCNC: 25 MMOL/L (ref 20–30)
CREAT SERPL-MCNC: 1 MG/DL (ref 0.4–1.2)
ERYTHROCYTE [DISTWIDTH] IN BLOOD BY AUTOMATED COUNT: 14.5 % (ref 11–16)
FOLATE SERPL-MCNC: 5.74 NG/ML
GFR SERPLBLD CREATININE-BSD FMLA CKD-EPI: 57 ML/MIN/{1.73_M2}
GLOBULIN SER CALC-MCNC: 3.1 G/DL
GLUCOSE SERPL-MCNC: 86 MG/DL (ref 74–106)
HCT VFR BLD AUTO: 42.8 % (ref 37–47)
HDLC SERPL-MCNC: 51 MG/DL (ref 40–60)
HGB BLD-MCNC: 13.9 G/DL (ref 11.5–16.5)
LDLC SERPL CALC-MCNC: 127 MG/DL
MCH RBC QN AUTO: 28.8 PG (ref 27–32)
MCHC RBC AUTO-ENTMCNC: 32.5 G/DL (ref 31–35)
MCV RBC AUTO: 88.6 FL (ref 80–100)
PLATELET # BLD AUTO: 295 K/UL (ref 150–400)
PMV BLD AUTO: 10.5 FL (ref 6–10)
POTASSIUM SERPL-SCNC: 5 MMOL/L (ref 3.4–5.1)
PROT SERPL-MCNC: 7.1 G/DL (ref 6.4–8.3)
RBC # BLD AUTO: 4.83 M/UL (ref 3.8–5.8)
SODIUM SERPL-SCNC: 139 MMOL/L (ref 136–145)
TRIGL SERPL-MCNC: 161 MG/DL (ref 0–249)
TSH SERPL DL<=0.005 MIU/L-ACNC: 2.74 UIU/ML (ref 0.27–4.2)
VIT B12 SERPL-MCNC: 405 PG/ML (ref 211–911)
VLDLC SERPL CALC-MCNC: 32 MG/DL
WBC # BLD AUTO: 8.4 K/UL (ref 4–11)

## 2025-03-18 PROCEDURE — 80053 COMPREHEN METABOLIC PANEL: CPT

## 2025-03-18 PROCEDURE — 84443 ASSAY THYROID STIM HORMONE: CPT

## 2025-03-18 PROCEDURE — 82746 ASSAY OF FOLIC ACID SERUM: CPT

## 2025-03-18 PROCEDURE — 82607 VITAMIN B-12: CPT

## 2025-03-18 PROCEDURE — 80061 LIPID PANEL: CPT

## 2025-03-18 PROCEDURE — 85027 COMPLETE CBC AUTOMATED: CPT

## 2025-03-18 PROCEDURE — 82306 VITAMIN D 25 HYDROXY: CPT

## 2025-03-19 ENCOUNTER — OFFICE VISIT (OUTPATIENT)
Age: 80
End: 2025-03-19
Payer: MEDICARE

## 2025-03-19 VITALS
HEART RATE: 60 BPM | RESPIRATION RATE: 16 BRPM | DIASTOLIC BLOOD PRESSURE: 78 MMHG | HEIGHT: 64 IN | TEMPERATURE: 97.8 F | WEIGHT: 153 LBS | OXYGEN SATURATION: 94 % | BODY MASS INDEX: 26.12 KG/M2 | SYSTOLIC BLOOD PRESSURE: 136 MMHG

## 2025-03-19 DIAGNOSIS — K59.09 CHRONIC CONSTIPATION: Primary | ICD-10-CM

## 2025-03-19 DIAGNOSIS — I10 PRIMARY HYPERTENSION: ICD-10-CM

## 2025-03-19 DIAGNOSIS — R35.0 URINARY FREQUENCY: ICD-10-CM

## 2025-03-19 DIAGNOSIS — R00.0 TACHYCARDIA: ICD-10-CM

## 2025-03-19 PROCEDURE — 1123F ACP DISCUSS/DSCN MKR DOCD: CPT | Performed by: NURSE PRACTITIONER

## 2025-03-19 PROCEDURE — 99214 OFFICE O/P EST MOD 30 MIN: CPT | Performed by: NURSE PRACTITIONER

## 2025-03-19 PROCEDURE — G8427 DOCREV CUR MEDS BY ELIG CLIN: HCPCS | Performed by: NURSE PRACTITIONER

## 2025-03-19 PROCEDURE — 3078F DIAST BP <80 MM HG: CPT | Performed by: NURSE PRACTITIONER

## 2025-03-19 PROCEDURE — 1090F PRES/ABSN URINE INCON ASSESS: CPT | Performed by: NURSE PRACTITIONER

## 2025-03-19 PROCEDURE — G8419 CALC BMI OUT NRM PARAM NOF/U: HCPCS | Performed by: NURSE PRACTITIONER

## 2025-03-19 PROCEDURE — 1036F TOBACCO NON-USER: CPT | Performed by: NURSE PRACTITIONER

## 2025-03-19 PROCEDURE — G8399 PT W/DXA RESULTS DOCUMENT: HCPCS | Performed by: NURSE PRACTITIONER

## 2025-03-19 PROCEDURE — 1159F MED LIST DOCD IN RCRD: CPT | Performed by: NURSE PRACTITIONER

## 2025-03-19 PROCEDURE — 3075F SYST BP GE 130 - 139MM HG: CPT | Performed by: NURSE PRACTITIONER

## 2025-03-19 RX ORDER — METOPROLOL SUCCINATE 25 MG/1
25 TABLET, EXTENDED RELEASE ORAL DAILY
Qty: 90 TABLET | Refills: 1 | Status: SHIPPED | OUTPATIENT
Start: 2025-03-19

## 2025-03-19 RX ORDER — POLYETHYLENE GLYCOL 3350 17 G/17G
17 POWDER, FOR SOLUTION ORAL DAILY
Qty: 510 G | Refills: 5 | Status: SHIPPED | OUTPATIENT
Start: 2025-03-19 | End: 2025-04-18

## 2025-03-19 RX ORDER — LEVOCETIRIZINE DIHYDROCHLORIDE 5 MG/1
5 TABLET, FILM COATED ORAL NIGHTLY
Qty: 30 TABLET | Refills: 5 | Status: SHIPPED | OUTPATIENT
Start: 2025-03-19

## 2025-03-19 RX ORDER — AMLODIPINE BESYLATE 2.5 MG/1
2.5 TABLET ORAL DAILY
Qty: 90 TABLET | Refills: 2 | Status: SHIPPED | OUTPATIENT
Start: 2025-03-19

## 2025-03-19 ASSESSMENT — ENCOUNTER SYMPTOMS
RESPIRATORY NEGATIVE: 1
ALLERGIC/IMMUNOLOGIC NEGATIVE: 1
GASTROINTESTINAL NEGATIVE: 1
EYES NEGATIVE: 1

## 2025-03-19 NOTE — PROGRESS NOTES
Chief Complaint   Patient presents with    Hypertension    Discuss Labs       Have you seen any other physician or provider since your last visit no    Have you had any other diagnostic tests since your last visit? yes - labs    Have you changed or stopped any medications since your last visit? no        SUBJECTIVE:    Patient ID:Rima Bourne is a 79 y.o. female.    Chief Complaint   Patient presents with    Hypertension    Discuss Labs     HPI:  Patient is here to follow up on hypertension and tachycardia. She denies any episodes.     History of Present Illness  The patient is a 79-year-old female who presents for evaluation of urinary frequency, constipation, vitamin D deficiency, and hypertension.    She reports an increase in urinary frequency, which she attributes to her age.    She has been experiencing bowel irregularities, with bowel movements occurring approximately every 3 days. She has not sought any treatment for this issue and has not previously used MiraLAX.    She is not currently on any vitamin D supplementation.    Her blood pressure readings at home have consistently been within the 130s range. She is on amlodipine.    Supplemental Information  She continues to self-administer Dupixent injections into her abdomen, which she reports as being well-tolerated.    MEDICATIONS  Current: Dupixent, amlodipine, metoprolol.      Patient's medications, allergies, past medical, surgical, social and family histories were reviewed and updated as appropriate.          Review of Systems   Constitutional: Negative.    HENT: Negative.     Eyes: Negative.    Respiratory: Negative.     Cardiovascular: Negative.    Gastrointestinal: Negative.    Endocrine: Negative.    Genitourinary: Negative.    Musculoskeletal: Negative.    Skin: Negative.    Allergic/Immunologic: Negative.    Neurological: Negative.    Hematological: Negative.    Psychiatric/Behavioral: Negative.         OBJECTIVE:  /78 (BP Site: Left Upper

## 2025-07-18 ENCOUNTER — OFFICE VISIT (OUTPATIENT)
Age: 80
End: 2025-07-18
Payer: MEDICARE

## 2025-07-18 VITALS
BODY MASS INDEX: 27.31 KG/M2 | SYSTOLIC BLOOD PRESSURE: 162 MMHG | HEART RATE: 66 BPM | WEIGHT: 159.2 LBS | OXYGEN SATURATION: 96 % | DIASTOLIC BLOOD PRESSURE: 77 MMHG

## 2025-07-18 DIAGNOSIS — R00.0 TACHYCARDIA: ICD-10-CM

## 2025-07-18 DIAGNOSIS — E66.3 OVER WEIGHT: ICD-10-CM

## 2025-07-18 DIAGNOSIS — K59.09 CHRONIC CONSTIPATION: ICD-10-CM

## 2025-07-18 DIAGNOSIS — I10 PRIMARY HYPERTENSION: Primary | ICD-10-CM

## 2025-07-18 DIAGNOSIS — Z13.29 THYROID DISORDER SCREEN: ICD-10-CM

## 2025-07-18 DIAGNOSIS — E55.9 VITAMIN D DEFICIENCY: ICD-10-CM

## 2025-07-18 DIAGNOSIS — R79.89 ELEVATED LIVER FUNCTION TESTS: ICD-10-CM

## 2025-07-18 PROBLEM — J96.01 ACUTE RESPIRATORY FAILURE WITH HYPOXIA (HCC): Status: RESOLVED | Noted: 2023-12-23 | Resolved: 2025-07-18

## 2025-07-18 PROCEDURE — 1036F TOBACCO NON-USER: CPT | Performed by: NURSE PRACTITIONER

## 2025-07-18 PROCEDURE — 99214 OFFICE O/P EST MOD 30 MIN: CPT | Performed by: NURSE PRACTITIONER

## 2025-07-18 PROCEDURE — G8399 PT W/DXA RESULTS DOCUMENT: HCPCS | Performed by: NURSE PRACTITIONER

## 2025-07-18 PROCEDURE — G8427 DOCREV CUR MEDS BY ELIG CLIN: HCPCS | Performed by: NURSE PRACTITIONER

## 2025-07-18 PROCEDURE — G8419 CALC BMI OUT NRM PARAM NOF/U: HCPCS | Performed by: NURSE PRACTITIONER

## 2025-07-18 PROCEDURE — 1159F MED LIST DOCD IN RCRD: CPT | Performed by: NURSE PRACTITIONER

## 2025-07-18 PROCEDURE — 1123F ACP DISCUSS/DSCN MKR DOCD: CPT | Performed by: NURSE PRACTITIONER

## 2025-07-18 PROCEDURE — 3077F SYST BP >= 140 MM HG: CPT | Performed by: NURSE PRACTITIONER

## 2025-07-18 PROCEDURE — 3078F DIAST BP <80 MM HG: CPT | Performed by: NURSE PRACTITIONER

## 2025-07-18 PROCEDURE — 1090F PRES/ABSN URINE INCON ASSESS: CPT | Performed by: NURSE PRACTITIONER

## 2025-07-18 NOTE — PROGRESS NOTES
Chief Complaint   Patient presents with    Follow-up     Pt is here for 4 month follow up.        Have you seen any other physician or provider since your last visit no    Have you had any other diagnostic tests since your last visit? no    Have you changed or stopped any medications since your last visit? no     
        ASSESSMENT/PLAN:     Rima was seen today for follow-up.    Diagnoses and all orders for this visit:  Assessment & Plan  1. Hypertension.  - Advised to maintain a log of blood pressure readings at home and bring it to the next appointment.  - Current regimen of metoprolol 25 mg and Norvasc 2.5 mg will be continued.  - Blood work to be conducted prior to the next appointment.  - Follow-up scheduled in 4 months.    2. Weight management.  - Weight has increased from 127 to 159, with a BMI of 27, which is at the upper limit of the overweight category.  - Advised to focus on dietary control and physical activity.  - Recommended to consume three small, regular meals daily to prevent the body from storing excess calories.  - No medication prescribed for weight loss due to current BMI.    3. Health maintenance.  - Administering Dupixent injections every 2 weeks by herself without issues.  - Reports improvement in constipation and has not needed medication for about a month.  - Blood work to be conducted prior to the next appointment.  - Follow-up scheduled in 4 months.      Primary hypertension  -     Lipid Panel; Future  -     Comprehensive Metabolic Panel; Future  -     CBC; Future  Amlodipine 2.5 mg daily  Tachycardia  Amlodipine 2.5 mg   Metoprolol xl 25 mg   Chronic constipation    Elevated liver function tests    Over weight    Vitamin D deficiency  -     Vitamin B12 & Folate; Future  -     Vitamin D 25 Hydroxy; Future    Thyroid disorder screen  -     TSH; Future      There are no discontinued medications.

## 2025-08-19 ENCOUNTER — OFFICE VISIT (OUTPATIENT)
Age: 80
End: 2025-08-19
Payer: MEDICARE

## 2025-08-19 VITALS
RESPIRATION RATE: 16 BRPM | DIASTOLIC BLOOD PRESSURE: 71 MMHG | OXYGEN SATURATION: 95 % | HEART RATE: 65 BPM | WEIGHT: 160.6 LBS | HEIGHT: 64 IN | BODY MASS INDEX: 27.42 KG/M2 | TEMPERATURE: 98.2 F | SYSTOLIC BLOOD PRESSURE: 146 MMHG

## 2025-08-19 DIAGNOSIS — B35.6 TINEA CRURIS: Primary | ICD-10-CM

## 2025-08-19 PROCEDURE — 3077F SYST BP >= 140 MM HG: CPT | Performed by: NURSE PRACTITIONER

## 2025-08-19 PROCEDURE — 1090F PRES/ABSN URINE INCON ASSESS: CPT | Performed by: NURSE PRACTITIONER

## 2025-08-19 PROCEDURE — 1159F MED LIST DOCD IN RCRD: CPT | Performed by: NURSE PRACTITIONER

## 2025-08-19 PROCEDURE — 1123F ACP DISCUSS/DSCN MKR DOCD: CPT | Performed by: NURSE PRACTITIONER

## 2025-08-19 PROCEDURE — G8427 DOCREV CUR MEDS BY ELIG CLIN: HCPCS | Performed by: NURSE PRACTITIONER

## 2025-08-19 PROCEDURE — 99213 OFFICE O/P EST LOW 20 MIN: CPT | Performed by: NURSE PRACTITIONER

## 2025-08-19 PROCEDURE — G8399 PT W/DXA RESULTS DOCUMENT: HCPCS | Performed by: NURSE PRACTITIONER

## 2025-08-19 PROCEDURE — 3078F DIAST BP <80 MM HG: CPT | Performed by: NURSE PRACTITIONER

## 2025-08-19 PROCEDURE — G8419 CALC BMI OUT NRM PARAM NOF/U: HCPCS | Performed by: NURSE PRACTITIONER

## 2025-08-19 PROCEDURE — 1036F TOBACCO NON-USER: CPT | Performed by: NURSE PRACTITIONER

## 2025-08-19 RX ORDER — FLUCONAZOLE 100 MG/1
100 TABLET ORAL DAILY
Qty: 7 TABLET | Refills: 0 | Status: SHIPPED | OUTPATIENT
Start: 2025-08-19 | End: 2025-08-26

## 2025-08-19 RX ORDER — HYDROXYZINE PAMOATE 25 MG/1
25 CAPSULE ORAL 3 TIMES DAILY PRN
Qty: 30 CAPSULE | Refills: 0 | Status: SHIPPED | OUTPATIENT
Start: 2025-08-19 | End: 2025-08-29

## 2025-08-19 RX ORDER — CLOTRIMAZOLE 1 %
CREAM (GRAM) TOPICAL
Qty: 60 G | Refills: 1 | Status: SHIPPED | OUTPATIENT
Start: 2025-08-19 | End: 2025-08-26

## 2025-08-19 ASSESSMENT — ENCOUNTER SYMPTOMS
GASTROINTESTINAL NEGATIVE: 1
RESPIRATORY NEGATIVE: 1
COLOR CHANGE: 1
EYES NEGATIVE: 1
ALLERGIC/IMMUNOLOGIC NEGATIVE: 1

## 2025-08-25 DIAGNOSIS — R00.0 TACHYCARDIA: ICD-10-CM

## 2025-08-25 RX ORDER — METOPROLOL SUCCINATE 25 MG/1
25 TABLET, EXTENDED RELEASE ORAL DAILY
Qty: 90 TABLET | Refills: 1 | Status: SHIPPED | OUTPATIENT
Start: 2025-08-25